# Patient Record
Sex: FEMALE | Race: ASIAN | NOT HISPANIC OR LATINO | Employment: OTHER | URBAN - METROPOLITAN AREA
[De-identification: names, ages, dates, MRNs, and addresses within clinical notes are randomized per-mention and may not be internally consistent; named-entity substitution may affect disease eponyms.]

---

## 2019-08-02 ENCOUNTER — HOME CARE/HOSPICE - HEALTHEAST (OUTPATIENT)
Dept: HOME HEALTH SERVICES | Facility: HOME HEALTH | Age: 79
End: 2019-08-02

## 2019-08-05 ENCOUNTER — HOME CARE/HOSPICE - HEALTHEAST (OUTPATIENT)
Dept: HOME HEALTH SERVICES | Facility: HOME HEALTH | Age: 79
End: 2019-08-05

## 2019-08-05 ENCOUNTER — RECORDS - HEALTHEAST (OUTPATIENT)
Dept: ADMINISTRATIVE | Facility: OTHER | Age: 79
End: 2019-08-05

## 2019-08-06 ENCOUNTER — HOME CARE/HOSPICE - HEALTHEAST (OUTPATIENT)
Dept: HOME HEALTH SERVICES | Facility: HOME HEALTH | Age: 79
End: 2019-08-06

## 2019-08-20 ENCOUNTER — ANESTHESIA - HEALTHEAST (OUTPATIENT)
Dept: SURGERY | Facility: HOSPITAL | Age: 79
End: 2019-08-20

## 2019-08-20 ASSESSMENT — MIFFLIN-ST. JEOR: SCORE: 919.02

## 2019-08-21 ENCOUNTER — SURGERY - HEALTHEAST (OUTPATIENT)
Dept: SURGERY | Facility: HOSPITAL | Age: 79
End: 2019-08-21

## 2019-08-24 ENCOUNTER — HOME CARE/HOSPICE - HEALTHEAST (OUTPATIENT)
Dept: HOME HEALTH SERVICES | Facility: HOME HEALTH | Age: 79
End: 2019-08-24

## 2019-08-26 ENCOUNTER — HOME CARE/HOSPICE - HEALTHEAST (OUTPATIENT)
Dept: HOME HEALTH SERVICES | Facility: HOME HEALTH | Age: 79
End: 2019-08-26

## 2019-08-26 ENCOUNTER — RECORDS - HEALTHEAST (OUTPATIENT)
Dept: ADMINISTRATIVE | Facility: OTHER | Age: 79
End: 2019-08-26

## 2019-08-28 ENCOUNTER — HOME CARE/HOSPICE - HEALTHEAST (OUTPATIENT)
Dept: HOME HEALTH SERVICES | Facility: HOME HEALTH | Age: 79
End: 2019-08-28

## 2019-10-16 ENCOUNTER — RECORDS - HEALTHEAST (OUTPATIENT)
Dept: ADMINISTRATIVE | Facility: OTHER | Age: 79
End: 2019-10-16

## 2019-10-24 ENCOUNTER — HOSPITAL ENCOUNTER (OUTPATIENT)
Dept: ULTRASOUND IMAGING | Facility: HOSPITAL | Age: 79
Discharge: HOME | End: 2019-10-24
Attending: ORTHOPAEDIC SURGERY

## 2019-10-24 DIAGNOSIS — S82.209A: ICD-10-CM

## 2019-10-24 DIAGNOSIS — S82.409A: ICD-10-CM

## 2019-11-05 ENCOUNTER — RECORDS - HEALTHEAST (OUTPATIENT)
Dept: ADMINISTRATIVE | Facility: OTHER | Age: 79
End: 2019-11-05

## 2021-02-09 ENCOUNTER — COMMUNICATION - HEALTHEAST (OUTPATIENT)
Dept: SCHEDULING | Facility: CLINIC | Age: 81
End: 2021-02-09

## 2021-02-14 ENCOUNTER — HOME CARE/HOSPICE - HEALTHEAST (OUTPATIENT)
Dept: HOME HEALTH SERVICES | Facility: HOME HEALTH | Age: 81
End: 2021-02-14

## 2021-03-27 ENCOUNTER — COMMUNICATION - HEALTHEAST (OUTPATIENT)
Dept: SCHEDULING | Facility: CLINIC | Age: 81
End: 2021-03-27

## 2021-05-28 ENCOUNTER — RECORDS - HEALTHEAST (OUTPATIENT)
Dept: ADMINISTRATIVE | Facility: CLINIC | Age: 81
End: 2021-05-28

## 2021-05-31 NOTE — ANESTHESIA CARE TRANSFER NOTE
Last vitals:   Vitals:    08/21/19 1955   BP: 176/84   Pulse: 90   Resp: 20   Temp: 36.6  C (97.8  F)   SpO2: 95%     Patient's level of consciousness is awake  Spontaneous respirations: yes  Maintains airway independently: yes  Dentition unchanged: yes  Oropharynx: oropharynx clear of all foreign objects    QCDR Measures:  ASA# 20 - Surgical Safety Checklist: WHO surgical safety checklist completed prior to induction    PQRS# 430 - Adult PONV Prevention: 4558F - Pt received => 2 anti-emetic agents (different classes) preop & intraop  ASA# 8 - Peds PONV Prevention: NA - Not pediatric patient, not GA or 2 or more risk factors NOT present  PQRS# 424 - Chanel-op Temp Management: 4559F - At least one body temp DOCUMENTED => 35.5C or 95.9F within required timeframe  PQRS# 426 - PACU Transfer Protocol: - Transfer of care checklist used  ASA# 14 - Acute Post-op Pain: ASA14B - Patient did NOT experience pain >= 7 out of 10

## 2021-05-31 NOTE — ANESTHESIA PROCEDURE NOTES
Spinal Block    Patient location during procedure: OR  Start time: 8/21/2019 2:46 PM  End time: 8/21/2019 2:50 PM  Reason for block: primary anesthetic    Staffing:  Performing  Anesthesiologist: Kris Camarena MD    Preanesthetic Checklist  Completed: patient identified, risks, benefits, and alternatives discussed, timeout performed, consent obtained, airway assessed, oxygen available, suction available, emergency drugs available and hand hygiene performed  Spinal Block  Patient position: sitting  Prep: ChloraPrep and site prepped and draped  Patient monitoring: heart rate, continuous pulse ox and blood pressure  Approach: left paramedian  Location: L4-5  Injection technique: single-shot  Needle type: Quincke   Needle gauge: 22 G    Assessment  Sensory level: T10

## 2021-05-31 NOTE — ANESTHESIA PREPROCEDURE EVALUATION
Anesthesia Evaluation      Patient summary reviewed     Airway   Mallampati: III   Pulmonary - normal exam   (+) shortness of breath,                          Cardiovascular   (+) hypertension, CAD, ,     ECG reviewed     ROS comment: EF 60%     Neuro/Psych - negative ROS     Endo/Other    (+) diabetes mellitus type 2 well controlled, hyperthyroidism,      GI/Hepatic/Renal    (+)   chronic renal disease,           Dental    (+) edentulous                       Anesthesia Plan  Planned anesthetic: spinal    ASA 3     Anesthetic plan and risks discussed with: patient and  services used    Post-op plan: routine recovery

## 2021-05-31 NOTE — ANESTHESIA POSTPROCEDURE EVALUATION
Patient: Mao Her  LEFT DISTAL OPEN REDUCTION INTERNAL FIXATION INTERMEDULARY NAIL OF TIBIA AND FIBULA  Anesthesia type: spinal    Patient location: PACU  Last vitals:   Vitals Value Taken Time   /78 8/21/2019  9:17 PM   Temp 35.6  C (96.1  F) 8/21/2019  9:17 PM   Pulse 66 8/21/2019  9:17 PM   Resp 18 8/21/2019  9:01 PM   SpO2 97 % 8/21/2019  9:26 PM   Vitals shown include unvalidated device data.  Post vital signs: stable  Level of consciousness: awake and responds to simple questions  Post-anesthesia pain: pain controlled  Post-anesthesia nausea and vomiting: no  Pulmonary: unassisted, return to baseline  Cardiovascular: stable and blood pressure at baseline  Hydration: adequate  Anesthetic events: no    QCDR Measures:  ASA# 11 - Chanel-op Cardiac Arrest: ASA11B - Patient did NOT experience unanticipated cardiac arrest  ASA# 12 - Chanel-op Mortality Rate: ASA12B - Patient did NOT die  ASA# 13 - PACU Re-Intubation Rate: ASA13B - Patient did NOT require a new airway mgmt  ASA# 10 - Composite Anes Safety: ASA10A - No serious adverse event    Additional Notes:

## 2021-06-01 ENCOUNTER — RECORDS - HEALTHEAST (OUTPATIENT)
Dept: ADMINISTRATIVE | Facility: CLINIC | Age: 81
End: 2021-06-01

## 2021-06-02 ENCOUNTER — RECORDS - HEALTHEAST (OUTPATIENT)
Dept: ADMINISTRATIVE | Facility: CLINIC | Age: 81
End: 2021-06-02

## 2021-06-03 ENCOUNTER — RECORDS - HEALTHEAST (OUTPATIENT)
Dept: ADMINISTRATIVE | Facility: CLINIC | Age: 81
End: 2021-06-03

## 2021-06-03 VITALS — WEIGHT: 129 LBS | HEIGHT: 57 IN | BODY MASS INDEX: 27.83 KG/M2

## 2021-06-15 PROBLEM — N17.9 AKI (ACUTE KIDNEY INJURY) (H): Status: ACTIVE | Noted: 2017-04-30

## 2021-06-15 PROBLEM — J18.9 COMMUNITY ACQUIRED PNEUMONIA, UNSPECIFIED LATERALITY: Status: ACTIVE | Noted: 2017-02-21

## 2021-06-15 PROBLEM — I10 ACCELERATED HYPERTENSION: Status: ACTIVE | Noted: 2017-02-21

## 2021-06-15 PROBLEM — J96.01 ACUTE RESPIRATORY FAILURE WITH HYPOXIA (H): Status: ACTIVE | Noted: 2017-02-21

## 2021-06-15 PROBLEM — J96.00 ACUTE RESPIRATORY FAILURE (H): Status: ACTIVE | Noted: 2017-02-21

## 2021-06-15 PROBLEM — R65.10 SIRS (SYSTEMIC INFLAMMATORY RESPONSE SYNDROME) (H): Status: ACTIVE | Noted: 2017-02-21

## 2021-06-15 PROBLEM — J10.1 INFLUENZA A: Status: ACTIVE | Noted: 2017-02-21

## 2021-06-15 PROBLEM — R07.89 CHEST TIGHTNESS: Status: ACTIVE | Noted: 2017-02-21

## 2021-06-15 PROBLEM — R52 INADEQUATE PAIN CONTROL: Status: ACTIVE | Noted: 2017-04-29

## 2021-06-15 PROBLEM — R06.03 RESPIRATORY DISTRESS: Status: ACTIVE | Noted: 2017-02-21

## 2021-06-15 PROBLEM — M54.10 RADICULAR PAIN OF RIGHT LOWER EXTREMITY: Status: ACTIVE | Noted: 2017-02-21

## 2021-06-16 PROBLEM — R06.02 SOB (SHORTNESS OF BREATH): Status: ACTIVE | Noted: 2019-06-19

## 2021-06-16 PROBLEM — I16.0 ASYMPTOMATIC HYPERTENSIVE URGENCY: Status: ACTIVE | Noted: 2019-07-31

## 2021-06-16 PROBLEM — E87.5 HYPERKALEMIA: Status: ACTIVE | Noted: 2021-02-09

## 2021-06-16 PROBLEM — I16.1 HYPERTENSIVE EMERGENCY: Status: ACTIVE | Noted: 2019-11-08

## 2021-06-16 PROBLEM — I16.0 HYPERTENSIVE URGENCY: Status: ACTIVE | Noted: 2020-02-12

## 2021-06-16 PROBLEM — E07.9 THYROID DISEASE: Status: ACTIVE | Noted: 2021-02-09

## 2021-06-16 PROBLEM — B34.9 ACUTE BRONCHOSPASM DUE TO VIRAL INFECTION: Status: ACTIVE | Noted: 2019-06-19

## 2021-06-16 PROBLEM — E56.9 VITAMIN DEFICIENCY: Status: ACTIVE | Noted: 2021-02-12

## 2021-06-16 PROBLEM — N17.9 ACUTE RENAL FAILURE SUPERIMPOSED ON STAGE 4 CHRONIC KIDNEY DISEASE, UNSPECIFIED ACUTE RENAL FAILURE TYPE (H): Status: ACTIVE | Noted: 2021-03-26

## 2021-06-16 PROBLEM — S82.402G: Status: ACTIVE | Noted: 2019-08-21

## 2021-06-16 PROBLEM — N17.9 ARF (ACUTE RENAL FAILURE) (H): Status: ACTIVE | Noted: 2021-02-10

## 2021-06-16 PROBLEM — D72.829 LEUKOCYTOSIS: Status: ACTIVE | Noted: 2019-07-31

## 2021-06-16 PROBLEM — J98.01 ACUTE BRONCHOSPASM DUE TO VIRAL INFECTION: Status: ACTIVE | Noted: 2019-06-19

## 2021-06-16 PROBLEM — K57.92 DIVERTICULITIS: Status: ACTIVE | Noted: 2018-04-17

## 2021-06-16 PROBLEM — S82.892A ANKLE FRACTURE, LEFT, CLOSED, INITIAL ENCOUNTER: Status: ACTIVE | Noted: 2019-07-31

## 2021-06-16 PROBLEM — I10 ESSENTIAL HYPERTENSION, BENIGN: Status: ACTIVE | Noted: 2020-02-12

## 2021-06-16 PROBLEM — S82.202G: Status: ACTIVE | Noted: 2019-08-21

## 2021-06-16 PROBLEM — N18.4 ACUTE RENAL FAILURE SUPERIMPOSED ON STAGE 4 CHRONIC KIDNEY DISEASE, UNSPECIFIED ACUTE RENAL FAILURE TYPE (H): Status: ACTIVE | Noted: 2021-03-26

## 2021-08-12 ENCOUNTER — HOSPITAL ENCOUNTER (INPATIENT)
Facility: HOSPITAL | Age: 81
LOS: 2 days | Discharge: HOSPICE | DRG: 682 | End: 2021-08-14
Attending: EMERGENCY MEDICINE | Admitting: HOSPITALIST
Payer: COMMERCIAL

## 2021-08-12 DIAGNOSIS — Z51.5 HOSPICE CARE PATIENT: Primary | ICD-10-CM

## 2021-08-12 DIAGNOSIS — E87.5 HYPERKALEMIA: ICD-10-CM

## 2021-08-12 DIAGNOSIS — N17.9 ACUTE RENAL FAILURE, UNSPECIFIED ACUTE RENAL FAILURE TYPE (H): ICD-10-CM

## 2021-08-12 DIAGNOSIS — I16.1 HYPERTENSIVE EMERGENCY: ICD-10-CM

## 2021-08-12 LAB
ALBUMIN SERPL-MCNC: 2.7 G/DL (ref 3.5–5)
ALBUMIN UR-MCNC: 300 MG/DL
ALP SERPL-CCNC: 112 U/L (ref 45–120)
ALT SERPL W P-5'-P-CCNC: 15 U/L (ref 0–45)
ANION GAP SERPL CALCULATED.3IONS-SCNC: 10 MMOL/L (ref 5–18)
APPEARANCE UR: CLEAR
AST SERPL W P-5'-P-CCNC: 28 U/L (ref 0–40)
BACTERIA #/AREA URNS HPF: ABNORMAL /HPF
BASOPHILS # BLD AUTO: 0 10E3/UL (ref 0–0.2)
BASOPHILS NFR BLD AUTO: 0 %
BILIRUB SERPL-MCNC: 0.4 MG/DL (ref 0–1)
BILIRUB UR QL STRIP: NEGATIVE
BNP SERPL-MCNC: 1547 PG/ML (ref 0–167)
BUN SERPL-MCNC: 73 MG/DL (ref 8–28)
CALCIUM SERPL-MCNC: 8.4 MG/DL (ref 8.5–10.5)
CHLORIDE BLD-SCNC: 111 MMOL/L (ref 98–107)
CO2 SERPL-SCNC: 16 MMOL/L (ref 22–31)
COLOR UR AUTO: COLORLESS
CREAT SERPL-MCNC: 7.91 MG/DL (ref 0.6–1.1)
EOSINOPHIL # BLD AUTO: 0.2 10E3/UL (ref 0–0.7)
EOSINOPHIL NFR BLD AUTO: 2 %
ERYTHROCYTE [DISTWIDTH] IN BLOOD BY AUTOMATED COUNT: 14.5 % (ref 10–15)
GFR SERPL CREATININE-BSD FRML MDRD: 4 ML/MIN/1.73M2
GLUCOSE BLD-MCNC: 108 MG/DL (ref 70–125)
GLUCOSE BLDC GLUCOMTR-MCNC: 103 MG/DL (ref 70–125)
GLUCOSE BLDC GLUCOMTR-MCNC: 239 MG/DL (ref 70–125)
GLUCOSE UR STRIP-MCNC: 100 MG/DL
HCT VFR BLD AUTO: 29.7 % (ref 35–47)
HGB BLD-MCNC: 9.7 G/DL (ref 11.7–15.7)
HGB UR QL STRIP: ABNORMAL
HYALINE CASTS: 2 /LPF
IMM GRANULOCYTES # BLD: 0 10E3/UL
IMM GRANULOCYTES NFR BLD: 0 %
KETONES UR STRIP-MCNC: NEGATIVE MG/DL
LEUKOCYTE ESTERASE UR QL STRIP: NEGATIVE
LYMPHOCYTES # BLD AUTO: 3.1 10E3/UL (ref 0.8–5.3)
LYMPHOCYTES NFR BLD AUTO: 28 %
MAGNESIUM SERPL-MCNC: 2.5 MG/DL (ref 1.8–2.6)
MCH RBC QN AUTO: 28 PG (ref 26.5–33)
MCHC RBC AUTO-ENTMCNC: 32.7 G/DL (ref 31.5–36.5)
MCV RBC AUTO: 86 FL (ref 78–100)
MONOCYTES # BLD AUTO: 0.6 10E3/UL (ref 0–1.3)
MONOCYTES NFR BLD AUTO: 5 %
MUCOUS THREADS #/AREA URNS LPF: PRESENT /LPF
NEUTROPHILS # BLD AUTO: 7.1 10E3/UL (ref 1.6–8.3)
NEUTROPHILS NFR BLD AUTO: 65 %
NITRATE UR QL: NEGATIVE
NRBC # BLD AUTO: 0 10E3/UL
NRBC BLD AUTO-RTO: 0 /100
PH UR STRIP: 7 [PH] (ref 5–7)
PLATELET # BLD AUTO: 108 10E3/UL (ref 150–450)
POTASSIUM BLD-SCNC: 6.2 MMOL/L (ref 3.5–5)
PROT SERPL-MCNC: 6.6 G/DL (ref 6–8)
RBC # BLD AUTO: 3.46 10E6/UL (ref 3.8–5.2)
RBC URINE: 4 /HPF
SARS-COV-2 RNA RESP QL NAA+PROBE: NEGATIVE
SODIUM SERPL-SCNC: 137 MMOL/L (ref 136–145)
SP GR UR STRIP: 1.01 (ref 1–1.03)
SQUAMOUS EPITHELIAL: 1 /HPF
TROPONIN I SERPL-MCNC: 0.02 NG/ML (ref 0–0.29)
UROBILINOGEN UR STRIP-MCNC: <2 MG/DL
WBC # BLD AUTO: 11 10E3/UL (ref 4–11)
WBC URINE: 5 /HPF

## 2021-08-12 PROCEDURE — 83880 ASSAY OF NATRIURETIC PEPTIDE: CPT | Performed by: EMERGENCY MEDICINE

## 2021-08-12 PROCEDURE — 96375 TX/PRO/DX INJ NEW DRUG ADDON: CPT

## 2021-08-12 PROCEDURE — 120N000001 HC R&B MED SURG/OB

## 2021-08-12 PROCEDURE — 258N000001 HC RX 258: Performed by: EMERGENCY MEDICINE

## 2021-08-12 PROCEDURE — 84484 ASSAY OF TROPONIN QUANT: CPT | Performed by: EMERGENCY MEDICINE

## 2021-08-12 PROCEDURE — 250N000011 HC RX IP 250 OP 636: Performed by: EMERGENCY MEDICINE

## 2021-08-12 PROCEDURE — 250N000009 HC RX 250: Performed by: EMERGENCY MEDICINE

## 2021-08-12 PROCEDURE — 99285 EMERGENCY DEPT VISIT HI MDM: CPT | Mod: 25

## 2021-08-12 PROCEDURE — 96374 THER/PROPH/DIAG INJ IV PUSH: CPT

## 2021-08-12 PROCEDURE — 80053 COMPREHEN METABOLIC PANEL: CPT | Performed by: EMERGENCY MEDICINE

## 2021-08-12 PROCEDURE — 36415 COLL VENOUS BLD VENIPUNCTURE: CPT | Performed by: EMERGENCY MEDICINE

## 2021-08-12 PROCEDURE — 81001 URINALYSIS AUTO W/SCOPE: CPT | Performed by: EMERGENCY MEDICINE

## 2021-08-12 PROCEDURE — 93005 ELECTROCARDIOGRAM TRACING: CPT | Performed by: EMERGENCY MEDICINE

## 2021-08-12 PROCEDURE — 250N000012 HC RX MED GY IP 250 OP 636 PS 637: Performed by: EMERGENCY MEDICINE

## 2021-08-12 PROCEDURE — 87635 SARS-COV-2 COVID-19 AMP PRB: CPT | Performed by: EMERGENCY MEDICINE

## 2021-08-12 PROCEDURE — 83735 ASSAY OF MAGNESIUM: CPT | Performed by: EMERGENCY MEDICINE

## 2021-08-12 PROCEDURE — 85025 COMPLETE CBC W/AUTO DIFF WBC: CPT | Performed by: EMERGENCY MEDICINE

## 2021-08-12 PROCEDURE — C9803 HOPD COVID-19 SPEC COLLECT: HCPCS

## 2021-08-12 RX ORDER — HYDRALAZINE HYDROCHLORIDE 20 MG/ML
10 INJECTION INTRAMUSCULAR; INTRAVENOUS ONCE
Status: COMPLETED | OUTPATIENT
Start: 2021-08-12 | End: 2021-08-12

## 2021-08-12 RX ORDER — NITROGLYCERIN 20 MG/100ML
10-200 INJECTION INTRAVENOUS CONTINUOUS
Status: DISCONTINUED | OUTPATIENT
Start: 2021-08-12 | End: 2021-08-13

## 2021-08-12 RX ORDER — CALCIUM GLUCONATE 20 MG/ML
1 INJECTION, SOLUTION INTRAVENOUS ONCE
Status: COMPLETED | OUTPATIENT
Start: 2021-08-12 | End: 2021-08-13

## 2021-08-12 RX ORDER — HYDRALAZINE HYDROCHLORIDE 20 MG/ML
10 INJECTION INTRAMUSCULAR; INTRAVENOUS ONCE
Status: DISCONTINUED | OUTPATIENT
Start: 2021-08-12 | End: 2021-08-13

## 2021-08-12 RX ORDER — NICOTINE POLACRILEX 4 MG
15-30 LOZENGE BUCCAL
Status: DISCONTINUED | OUTPATIENT
Start: 2021-08-12 | End: 2021-08-14 | Stop reason: HOSPADM

## 2021-08-12 RX ORDER — DEXTROSE MONOHYDRATE 100 MG/ML
INJECTION, SOLUTION INTRAVENOUS CONTINUOUS
Status: DISCONTINUED | OUTPATIENT
Start: 2021-08-12 | End: 2021-08-13

## 2021-08-12 RX ORDER — DEXTROSE MONOHYDRATE 25 G/50ML
25-50 INJECTION, SOLUTION INTRAVENOUS
Status: DISCONTINUED | OUTPATIENT
Start: 2021-08-12 | End: 2021-08-14 | Stop reason: HOSPADM

## 2021-08-12 RX ORDER — ALBUTEROL SULFATE 5 MG/ML
10 SOLUTION RESPIRATORY (INHALATION) ONCE
Status: COMPLETED | OUTPATIENT
Start: 2021-08-12 | End: 2021-08-12

## 2021-08-12 RX ORDER — LIDOCAINE 40 MG/G
CREAM TOPICAL
Status: DISCONTINUED | OUTPATIENT
Start: 2021-08-12 | End: 2021-08-14 | Stop reason: HOSPADM

## 2021-08-12 RX ORDER — LORAZEPAM 2 MG/ML
1 INJECTION INTRAMUSCULAR ONCE
Status: COMPLETED | OUTPATIENT
Start: 2021-08-12 | End: 2021-08-12

## 2021-08-12 RX ORDER — DEXTROSE MONOHYDRATE 25 G/50ML
25 INJECTION, SOLUTION INTRAVENOUS ONCE
Status: COMPLETED | OUTPATIENT
Start: 2021-08-12 | End: 2021-08-12

## 2021-08-12 RX ADMIN — CALCIUM GLUCONATE 1 G: 20 INJECTION, SOLUTION INTRAVENOUS at 23:50

## 2021-08-12 RX ADMIN — DEXTROSE MONOHYDRATE 25 G: 500 INJECTION PARENTERAL at 23:30

## 2021-08-12 RX ADMIN — LORAZEPAM 1 MG: 2 INJECTION INTRAMUSCULAR; INTRAVENOUS at 23:00

## 2021-08-12 RX ADMIN — HUMAN INSULIN 6 UNITS: 100 INJECTION, SOLUTION SUBCUTANEOUS at 23:31

## 2021-08-12 RX ADMIN — HYDRALAZINE HYDROCHLORIDE 10 MG: 20 INJECTION INTRAMUSCULAR; INTRAVENOUS at 22:34

## 2021-08-12 RX ADMIN — SODIUM BICARBONATE 50 MEQ: 84 INJECTION, SOLUTION INTRAVENOUS at 23:30

## 2021-08-12 RX ADMIN — ALBUTEROL SULFATE 10 MG: 2.5 SOLUTION RESPIRATORY (INHALATION) at 23:47

## 2021-08-12 ASSESSMENT — ENCOUNTER SYMPTOMS
VOMITING: 0
ARTHRALGIAS: 0
ABDOMINAL PAIN: 0
HEADACHES: 0
DIFFICULTY URINATING: 0
EYE REDNESS: 0
SHORTNESS OF BREATH: 1
FATIGUE: 1
COUGH: 0
CONFUSION: 0
FEVER: 0
NECK STIFFNESS: 0
COLOR CHANGE: 0

## 2021-08-12 ASSESSMENT — MIFFLIN-ST. JEOR: SCORE: 877.74

## 2021-08-13 LAB
ANION GAP SERPL CALCULATED.3IONS-SCNC: 9 MMOL/L (ref 5–18)
BASE EXCESS BLDA CALC-SCNC: -5 MMOL/L
BUN SERPL-MCNC: 66 MG/DL (ref 8–28)
CALCIUM SERPL-MCNC: 8.3 MG/DL (ref 8.5–10.5)
CHLORIDE BLD-SCNC: 111 MMOL/L (ref 98–107)
CO2 SERPL-SCNC: 20 MMOL/L (ref 22–31)
COHGB MFR BLD: 95.1 % (ref 95–96)
CREAT SERPL-MCNC: 7.95 MG/DL (ref 0.6–1.1)
GFR SERPL CREATININE-BSD FRML MDRD: 4 ML/MIN/1.73M2
GLUCOSE BLD-MCNC: 141 MG/DL (ref 70–125)
GLUCOSE BLDC GLUCOMTR-MCNC: 123 MG/DL (ref 70–125)
GLUCOSE BLDC GLUCOMTR-MCNC: 135 MG/DL (ref 70–125)
GLUCOSE BLDC GLUCOMTR-MCNC: 138 MG/DL (ref 70–125)
GLUCOSE BLDC GLUCOMTR-MCNC: 143 MG/DL (ref 70–125)
GLUCOSE BLDC GLUCOMTR-MCNC: 149 MG/DL (ref 70–125)
GLUCOSE BLDC GLUCOMTR-MCNC: 165 MG/DL (ref 70–125)
GLUCOSE BLDC GLUCOMTR-MCNC: 196 MG/DL (ref 70–125)
HCO3 BLD-SCNC: 21 MMOL/L (ref 23–29)
INR PPP: 1.06 (ref 0.85–1.15)
O2/TOTAL GAS SETTING VFR VENT: 29 %
OXYHGB MFR BLD: 93.8 % (ref 95–96)
PCO2 BLD: 41 MM HG (ref 35–45)
PH BLD: 7.32 [PH] (ref 7.37–7.44)
PO2 BLD: 72 MM HG (ref 75–85)
POTASSIUM BLD-SCNC: 5.2 MMOL/L (ref 3.5–5)
POTASSIUM BLD-SCNC: 5.4 MMOL/L (ref 3.5–5)
POTASSIUM BLD-SCNC: 5.4 MMOL/L (ref 3.5–5)
SODIUM SERPL-SCNC: 140 MMOL/L (ref 136–145)
TEMPERATURE: 37 DEGREES C

## 2021-08-13 PROCEDURE — 82805 BLOOD GASES W/O2 SATURATION: CPT | Performed by: NURSE PRACTITIONER

## 2021-08-13 PROCEDURE — 120N000001 HC R&B MED SURG/OB

## 2021-08-13 PROCEDURE — 99207 PR CONSULT E&M CHANGED TO INITIAL LEVEL: CPT | Performed by: FAMILY MEDICINE

## 2021-08-13 PROCEDURE — 250N000011 HC RX IP 250 OP 636: Performed by: HOSPITALIST

## 2021-08-13 PROCEDURE — 99221 1ST HOSP IP/OBS SF/LOW 40: CPT | Performed by: FAMILY MEDICINE

## 2021-08-13 PROCEDURE — 250N000012 HC RX MED GY IP 250 OP 636 PS 637: Performed by: HOSPITALIST

## 2021-08-13 PROCEDURE — 99223 1ST HOSP IP/OBS HIGH 75: CPT | Performed by: HOSPITALIST

## 2021-08-13 PROCEDURE — 99233 SBSQ HOSP IP/OBS HIGH 50: CPT | Performed by: INTERNAL MEDICINE

## 2021-08-13 PROCEDURE — 82310 ASSAY OF CALCIUM: CPT | Performed by: HOSPITALIST

## 2021-08-13 PROCEDURE — 85610 PROTHROMBIN TIME: CPT | Performed by: INTERNAL MEDICINE

## 2021-08-13 PROCEDURE — 250N000009 HC RX 250: Performed by: INTERNAL MEDICINE

## 2021-08-13 PROCEDURE — 250N000011 HC RX IP 250 OP 636: Performed by: INTERNAL MEDICINE

## 2021-08-13 PROCEDURE — 36415 COLL VENOUS BLD VENIPUNCTURE: CPT | Performed by: EMERGENCY MEDICINE

## 2021-08-13 PROCEDURE — 250N000009 HC RX 250: Performed by: EMERGENCY MEDICINE

## 2021-08-13 PROCEDURE — 36569 INSJ PICC 5 YR+ W/O IMAGING: CPT

## 2021-08-13 PROCEDURE — 250N000013 HC RX MED GY IP 250 OP 250 PS 637: Performed by: FAMILY MEDICINE

## 2021-08-13 PROCEDURE — 80048 BASIC METABOLIC PNL TOTAL CA: CPT | Performed by: EMERGENCY MEDICINE

## 2021-08-13 PROCEDURE — 250N000011 HC RX IP 250 OP 636: Performed by: EMERGENCY MEDICINE

## 2021-08-13 PROCEDURE — 96375 TX/PRO/DX INJ NEW DRUG ADDON: CPT

## 2021-08-13 RX ORDER — GLYCOPYRROLATE 0.2 MG/ML
0.2 INJECTION, SOLUTION INTRAMUSCULAR; INTRAVENOUS EVERY 4 HOURS PRN
Status: DISCONTINUED | OUTPATIENT
Start: 2021-08-13 | End: 2021-08-14 | Stop reason: HOSPADM

## 2021-08-13 RX ORDER — NALOXONE HYDROCHLORIDE 0.4 MG/ML
0.1 INJECTION, SOLUTION INTRAMUSCULAR; INTRAVENOUS; SUBCUTANEOUS
Status: DISCONTINUED | OUTPATIENT
Start: 2021-08-13 | End: 2021-08-14 | Stop reason: HOSPADM

## 2021-08-13 RX ORDER — HEPARIN SODIUM 1000 [USP'U]/ML
500 INJECTION, SOLUTION INTRAVENOUS; SUBCUTANEOUS
Status: DISCONTINUED | OUTPATIENT
Start: 2021-08-13 | End: 2021-08-13

## 2021-08-13 RX ORDER — LORAZEPAM 1 MG/1
1 TABLET ORAL
Status: DISCONTINUED | OUTPATIENT
Start: 2021-08-13 | End: 2021-08-14 | Stop reason: HOSPADM

## 2021-08-13 RX ORDER — NALOXONE HYDROCHLORIDE 0.4 MG/ML
0.2 INJECTION, SOLUTION INTRAMUSCULAR; INTRAVENOUS; SUBCUTANEOUS
Status: DISCONTINUED | OUTPATIENT
Start: 2021-08-13 | End: 2021-08-14 | Stop reason: HOSPADM

## 2021-08-13 RX ORDER — PROCHLORPERAZINE MALEATE 5 MG
5 TABLET ORAL EVERY 6 HOURS PRN
Status: DISCONTINUED | OUTPATIENT
Start: 2021-08-13 | End: 2021-08-14 | Stop reason: HOSPADM

## 2021-08-13 RX ORDER — LABETALOL HYDROCHLORIDE 5 MG/ML
20 INJECTION, SOLUTION INTRAVENOUS EVERY 4 HOURS PRN
Status: DISCONTINUED | OUTPATIENT
Start: 2021-08-13 | End: 2021-08-14 | Stop reason: HOSPADM

## 2021-08-13 RX ORDER — HEPARIN SODIUM 5000 [USP'U]/.5ML
5000 INJECTION, SOLUTION INTRAVENOUS; SUBCUTANEOUS EVERY 12 HOURS
Status: DISCONTINUED | OUTPATIENT
Start: 2021-08-13 | End: 2021-08-13

## 2021-08-13 RX ORDER — HYDROMORPHONE HYDROCHLORIDE 1 MG/ML
.3-.5 INJECTION, SOLUTION INTRAMUSCULAR; INTRAVENOUS; SUBCUTANEOUS
Status: DISCONTINUED | OUTPATIENT
Start: 2021-08-13 | End: 2021-08-14 | Stop reason: HOSPADM

## 2021-08-13 RX ORDER — PROCHLORPERAZINE 25 MG
12.5 SUPPOSITORY, RECTAL RECTAL EVERY 12 HOURS PRN
Status: DISCONTINUED | OUTPATIENT
Start: 2021-08-13 | End: 2021-08-14 | Stop reason: HOSPADM

## 2021-08-13 RX ORDER — PANTOPRAZOLE SODIUM 20 MG/1
40 TABLET, DELAYED RELEASE ORAL
Status: DISCONTINUED | OUTPATIENT
Start: 2021-08-14 | End: 2021-08-14 | Stop reason: HOSPADM

## 2021-08-13 RX ORDER — SALIVA STIMULANT COMB. NO.3
2 SPRAY, NON-AEROSOL (ML) MUCOUS MEMBRANE 4 TIMES DAILY PRN
Status: DISCONTINUED | OUTPATIENT
Start: 2021-08-13 | End: 2021-08-14 | Stop reason: HOSPADM

## 2021-08-13 RX ORDER — ONDANSETRON 4 MG/1
4 TABLET, ORALLY DISINTEGRATING ORAL EVERY 6 HOURS PRN
Status: DISCONTINUED | OUTPATIENT
Start: 2021-08-13 | End: 2021-08-14 | Stop reason: HOSPADM

## 2021-08-13 RX ORDER — LORAZEPAM 2 MG/ML
1 INJECTION INTRAMUSCULAR
Status: DISCONTINUED | OUTPATIENT
Start: 2021-08-13 | End: 2021-08-14 | Stop reason: HOSPADM

## 2021-08-13 RX ORDER — HYDROMORPHONE HYDROCHLORIDE 1 MG/ML
1-2 SOLUTION ORAL EVERY 4 HOURS PRN
Status: DISCONTINUED | OUTPATIENT
Start: 2021-08-13 | End: 2021-08-14 | Stop reason: HOSPADM

## 2021-08-13 RX ORDER — ONDANSETRON 2 MG/ML
4 INJECTION INTRAMUSCULAR; INTRAVENOUS EVERY 6 HOURS PRN
Status: DISCONTINUED | OUTPATIENT
Start: 2021-08-13 | End: 2021-08-14 | Stop reason: HOSPADM

## 2021-08-13 RX ORDER — CARBOXYMETHYLCELLULOSE SODIUM 5 MG/ML
1-2 SOLUTION/ DROPS OPHTHALMIC
Status: DISCONTINUED | OUTPATIENT
Start: 2021-08-13 | End: 2021-08-14 | Stop reason: HOSPADM

## 2021-08-13 RX ORDER — DEXTROSE MONOHYDRATE 25 G/50ML
25-50 INJECTION, SOLUTION INTRAVENOUS
Status: DISCONTINUED | OUTPATIENT
Start: 2021-08-13 | End: 2021-08-14 | Stop reason: HOSPADM

## 2021-08-13 RX ORDER — IPRATROPIUM BROMIDE AND ALBUTEROL SULFATE 2.5; .5 MG/3ML; MG/3ML
3 SOLUTION RESPIRATORY (INHALATION) EVERY 4 HOURS PRN
Status: DISCONTINUED | OUTPATIENT
Start: 2021-08-13 | End: 2021-08-14 | Stop reason: HOSPADM

## 2021-08-13 RX ORDER — BUMETANIDE 0.25 MG/ML
5 INJECTION INTRAMUSCULAR; INTRAVENOUS ONCE
Status: COMPLETED | OUTPATIENT
Start: 2021-08-13 | End: 2021-08-13

## 2021-08-13 RX ORDER — AMOXICILLIN 250 MG
1 CAPSULE ORAL 2 TIMES DAILY
Status: DISCONTINUED | OUTPATIENT
Start: 2021-08-13 | End: 2021-08-14 | Stop reason: HOSPADM

## 2021-08-13 RX ORDER — MINERAL OIL/HYDROPHIL PETROLAT
OINTMENT (GRAM) TOPICAL
Status: DISCONTINUED | OUTPATIENT
Start: 2021-08-13 | End: 2021-08-14 | Stop reason: HOSPADM

## 2021-08-13 RX ORDER — ACETAMINOPHEN 325 MG/1
650 TABLET ORAL EVERY 6 HOURS PRN
Status: DISCONTINUED | OUTPATIENT
Start: 2021-08-13 | End: 2021-08-14 | Stop reason: HOSPADM

## 2021-08-13 RX ORDER — NICOTINE POLACRILEX 4 MG
15-30 LOZENGE BUCCAL
Status: DISCONTINUED | OUTPATIENT
Start: 2021-08-13 | End: 2021-08-14 | Stop reason: HOSPADM

## 2021-08-13 RX ORDER — METOPROLOL SUCCINATE 25 MG/1
25 TABLET, EXTENDED RELEASE ORAL DAILY
COMMUNITY

## 2021-08-13 RX ORDER — ACETAMINOPHEN 650 MG/1
650 SUPPOSITORY RECTAL EVERY 6 HOURS PRN
Status: DISCONTINUED | OUTPATIENT
Start: 2021-08-13 | End: 2021-08-14 | Stop reason: HOSPADM

## 2021-08-13 RX ORDER — LIDOCAINE 40 MG/G
CREAM TOPICAL
Status: DISCONTINUED | OUTPATIENT
Start: 2021-08-13 | End: 2021-08-13

## 2021-08-13 RX ORDER — HYDRALAZINE HYDROCHLORIDE 20 MG/ML
10 INJECTION INTRAMUSCULAR; INTRAVENOUS EVERY 4 HOURS PRN
Status: DISCONTINUED | OUTPATIENT
Start: 2021-08-13 | End: 2021-08-13

## 2021-08-13 RX ORDER — HALOPERIDOL 2 MG/ML
1 SOLUTION ORAL
Status: DISCONTINUED | OUTPATIENT
Start: 2021-08-13 | End: 2021-08-14 | Stop reason: HOSPADM

## 2021-08-13 RX ORDER — MORPHINE SULFATE 4 MG/ML
4 INJECTION, SOLUTION INTRAMUSCULAR; INTRAVENOUS ONCE
Status: COMPLETED | OUTPATIENT
Start: 2021-08-13 | End: 2021-08-13

## 2021-08-13 RX ORDER — ACETAMINOPHEN 650 MG/1
650 SUPPOSITORY RECTAL EVERY 6 HOURS PRN
Status: DISCONTINUED | OUTPATIENT
Start: 2021-08-13 | End: 2021-08-13

## 2021-08-13 RX ORDER — HEPARIN SODIUM 1000 [USP'U]/ML
500 INJECTION, SOLUTION INTRAVENOUS; SUBCUTANEOUS CONTINUOUS
Status: DISCONTINUED | OUTPATIENT
Start: 2021-08-13 | End: 2021-08-13

## 2021-08-13 RX ORDER — ACETAMINOPHEN 325 MG/1
975 TABLET ORAL 3 TIMES DAILY
Status: DISCONTINUED | OUTPATIENT
Start: 2021-08-13 | End: 2021-08-14 | Stop reason: HOSPADM

## 2021-08-13 RX ORDER — BISACODYL 10 MG
10 SUPPOSITORY, RECTAL RECTAL
Status: DISCONTINUED | OUTPATIENT
Start: 2021-08-16 | End: 2021-08-14 | Stop reason: HOSPADM

## 2021-08-13 RX ADMIN — CAMPHOR AND MENTHOL 1 APPLICATOR: 5; 5 LOTION TOPICAL at 18:04

## 2021-08-13 RX ADMIN — INSULIN ASPART 1 UNITS: 100 INJECTION, SOLUTION INTRAVENOUS; SUBCUTANEOUS at 04:03

## 2021-08-13 RX ADMIN — SODIUM BICARBONATE 50 MEQ: 84 INJECTION, SOLUTION INTRAVENOUS at 02:41

## 2021-08-13 RX ADMIN — LIDOCAINE HYDROCHLORIDE 1.5 ML: 10 INJECTION, SOLUTION EPIDURAL; INFILTRATION; INTRACAUDAL; PERINEURAL at 00:15

## 2021-08-13 RX ADMIN — MORPHINE SULFATE 4 MG: 4 INJECTION, SOLUTION INTRAMUSCULAR; INTRAVENOUS at 00:14

## 2021-08-13 RX ADMIN — LABETALOL HYDROCHLORIDE 20 MG: 5 INJECTION, SOLUTION INTRAVENOUS at 22:49

## 2021-08-13 RX ADMIN — HEPARIN SODIUM 5000 UNITS: 10000 INJECTION, SOLUTION INTRAVENOUS; SUBCUTANEOUS at 09:39

## 2021-08-13 RX ADMIN — BUMETANIDE 5 MG: 0.25 INJECTION INTRAMUSCULAR; INTRAVENOUS at 02:22

## 2021-08-13 ASSESSMENT — ACTIVITIES OF DAILY LIVING (ADL)
DOING_ERRANDS_INDEPENDENTLY_DIFFICULTY: NO
WEAR_GLASSES_OR_BLIND: YES
DIFFICULTY_COMMUNICATING: YES
DIFFICULTY_EATING/SWALLOWING: NO
TOILETING_ASSISTANCE: TOILETING DIFFICULTY, ASSISTANCE 1 PERSON
HEARING_DIFFICULTY_OR_DEAF: NO
FALL_HISTORY_WITHIN_LAST_SIX_MONTHS: YES
WALKING_OR_CLIMBING_STAIRS_DIFFICULTY: YES
DRESSING/BATHING: BATHING DIFFICULTY, ASSISTANCE 1 PERSON;DRESSING DIFFICULTY, ASSISTANCE 1 PERSON
INTERPRETER_SERVICES_OFFERED_TO_THE_PATIENT: YES
DRESSING/BATHING_DIFFICULTY: YES
WALKING_OR_CLIMBING_STAIRS: AMBULATION DIFFICULTY, ASSISTANCE 1 PERSON;AMBULATION DIFFICULTY, REQUIRES EQUIPMENT
TOILETING_ISSUES: YES
CONCENTRATING,_REMEMBERING_OR_MAKING_DECISIONS_DIFFICULTY: YES
EQUIPMENT_CURRENTLY_USED_AT_HOME: CANE, STRAIGHT
DEPENDENT_IADLS:: MEAL PREPARATION;MEDICATION MANAGEMENT;TRANSPORTATION
NUMBER_OF_TIMES_PATIENT_HAS_FALLEN_WITHIN_LAST_SIX_MONTHS: 1
PATIENT_/_FAMILY_COMMUNICATION_STYLE: SPOKEN LANGUAGE (NON-ENGLISH)

## 2021-08-13 ASSESSMENT — MIFFLIN-ST. JEOR: SCORE: 815.38

## 2021-08-13 NOTE — PLAN OF CARE
Problem: Pain Acute  Goal: Acceptable Pain Control and Functional Ability  Outcome: Improving  Intervention: Prevent or Manage Pain  Recent Flowsheet Documentation  Taken 8/13/2021 0400 by Deanne Churchill RN  Sensory Stimulation Regulation:   auditory stimulation minimized   care clustered   quiet environment promoted  Sleep/Rest Enhancement:   awakenings minimized   noise level reduced   regular sleep/rest pattern promoted   room darkened  Medication Review/Management: medications reviewed  Taken 8/13/2021 0300 by Deanne Churchill RN  Sensory Stimulation Regulation:   auditory stimulation minimized   care clustered   quiet environment promoted  Sleep/Rest Enhancement:   awakenings minimized   noise level reduced   regular sleep/rest pattern promoted   room darkened  Medication Review/Management: medications reviewed

## 2021-08-13 NOTE — PHARMACY-ADMISSION MEDICATION HISTORY
Pharmacy Note - Admission Medication History    Pertinent Provider Information: Information provided by the patient's son. He did not bring in bottles for atorvastatin and torsemide, however, he stated that she has them at home but only takes these occasionally.     ______________________________________________________________________    Prior To Admission (PTA) med list completed and updated in EMR.       PTA Med List   Medication Sig Note Last Dose     acetaminophen (TYLENOL) 325 MG tablet Take 975 mg by mouth every 6 hours as needed   PRN     aspirin 81 MG EC tablet [ASPIRIN 81 MG EC TABLET] Take 1 tablet (81 mg total) by mouth daily.  8/12/2021 at Unknown time     atorvastatin (LIPITOR) 40 MG tablet [ATORVASTATIN (LIPITOR) 40 MG TABLET] Take 40 mg by mouth daily. 8/13/2021: Family did not bring in a Rx bottle for this. Her son stated that she has it at home and sometimes takes it but not regularly      fluticasone propionate (FLONASE) 50 mcg/actuation nasal spray [FLUTICASONE PROPIONATE (FLONASE) 50 MCG/ACTUATION NASAL SPRAY] Apply 1 spray into each nostril daily as needed for rhinitis.       hydrALAZINE (APRESOLINE) 25 MG tablet [HYDRALAZINE (APRESOLINE) 25 MG TABLET] Take 1 tablet (25 mg total) by mouth 3 (three) times a day.  8/12/2021 at Unknown time     latanoprost (XALATAN) 0.005 % ophthalmic solution [LATANOPROST (XALATAN) 0.005 % OPHTHALMIC SOLUTION] Administer 1 drop to both eyes at bedtime.  8/12/2021 at Unknown time     levothyroxine (SYNTHROID, LEVOTHROID) 125 MCG tablet [LEVOTHYROXINE (SYNTHROID, LEVOTHROID) 125 MCG TABLET] Take 1 tablet (125 mcg total) by mouth Daily at 6:00 am.  8/12/2021 at Unknown time     metoprolol succinate ER (TOPROL-XL) 25 MG 24 hr tablet Take 25 mg by mouth daily  8/12/2021 at Unknown time     NIFEdipine ER (ADALAT CC) 60 MG 24 hr tablet Take 60 mg by mouth daily  8/12/2021 at Unknown time     NOVOLOG FLEXPEN U-100 INSULIN 100 unit/mL (3 mL) injection pen [NOVOLOG FLEXPEN  U-100 INSULIN 100 UNIT/ML (3 ML) INJECTION PEN] Check blood sugar four (4) times daily.  11.65 Type 2 with hyperglycemia  BD Ultra-fine Ashley Pen Needles - NDC [address]-24 - dispense 1 case,  refill PRN for 1 year  Microlet Color Lancets (100s) - dispense 1, refill PRN for 1 year  Contour Next Test Strips (50's) - dispense 1, refill PRN for 1 year       torsemide (DEMADEX) 100 MG tablet [TORSEMIDE (DEMADEX) 100 MG TABLET] Take 1 tablet (100 mg total) by mouth daily. 8/13/2021: Family did not bring in a Rx bottle for this. Her son stated that she has it at home and sometimes takes it but not regularly        Information source(s): Family member, Prescription bottles and CareEverywhere/SureScripts  Method of interview communication: in-person    Summary of Changes to PTA Med List  New: metoprolol  Discontinued: omeprazole, Liquifilm tears, Lantus, calcium/vit d  Changed: nifedipine    Patient was asked about OTC/herbal products specifically.  PTA med list reflects this.      Allergies were reviewed, assessed, and updated with the patient.      Patient did not bring any medications to the hospital and can't retrieve from home. No multi-dose medications are available for use during hospital stay.     The information provided in this note is only as accurate as the sources available at the time of the update(s).    Thank you for the opportunity to participate in the care of this patient.    Nakia Eisenberg RPH  8/13/2021 1:29 PM

## 2021-08-13 NOTE — PROGRESS NOTES
Beau Her is a 81 year old female with history of advanced chronic kidney disease stage V, type 2 diabetes, hypertension, hypothyroidism, chronic anemia, dyslipidemia, GERD, chronic pain, COPD brought to the emergency department for evaluation of progressive fatigue, loss of appetite, shortness of breath and worsening edema for about a wk. Admitted by my colleague this morning. I saw the patient in ICU this morning. Case discussed with patient's son, Eder over the phone, and Dr Burger from [facility]. Decision has been made not to initiate HD. Family members chose hospice approach. Patient is DNR/DNI now. Will transfer to the floor care. Hospice consulted. Possible discharge home with hospice tomorrow.

## 2021-08-13 NOTE — PROCEDURES
"PICC Line Insertion Procedure Note    Pt. Name: Beau Lam  MRN:        [id number]    Procedure: Insertion of a  TRIPLE Lumen  5 fr  Bard SOLO (valved) Power PICC, Lot number [id number]    Indications: Multiple Infusions    Contraindications : None    Procedure Details   Patient identified with 2 identifiers and \"Time Out\" conducted.  .     Central line insertion bundle followed: hand hygiene performed prior to procedure, site cleansed with cholraprep, hat, mask, sterile gloves,sterile gown worn, patient draped with maximum barrier head to toe drape, sterile field maintained.    The vein was assessed and found to be compressible and of adequate size. 1.5 ml 1% Lidocaine administered sq to the insertion site. A 5 Fr PICC was inserted into the BASILIC vein of the right arm with ultrasound guidance. ONE attempt(s) required to access vein.   Catheter threaded without difficulty. Good blood return noted.    Modified Seldinger Technique used for insertion.    The 8 sharps that are included in the PICC insertion kit were accounted for and disposed of in the sharps container prior to breakdown of the sterile field.    Catheter secured with Statlock, biopatch and Tegaderm dressing applied.    Findings:  Total catheter length  31 cm, with 0 cm exposed. Mid upper arm circumference is 27 cm. Catheter was flushed with 30 cc NS. Patient  tolerated procedure well.    Tip placement verified in the distal SVC by 3CG technology:        CLABSI prevention brochure left at bedside.    Patient's primary RN notified PICC is ready for use.    Comments:          Evan Lang, RN, MSN, RCIS, VA-BC   Vascular Access - [facility]        "

## 2021-08-13 NOTE — PLAN OF CARE
Problem: Adult Inpatient Plan of Care  Goal: Patient-Specific Goal (Individualized)  Outcome: Change based on patient need/priority  Flowsheets (Taken 8/13/2021 3654)  Individualized Care Needs: pt has moved to comfort hospice care  Patient-Specific Goals (Include Timeframe): transfer home on hospice care

## 2021-08-13 NOTE — CONSULTS
Care Management Initial Consult    General Information  Assessment completed with: VM-chart review, details from previous admission       Primary Care Provider verified and updated as needed:     Readmission within the last 30 days:        Reason for Consult: discharge planning  Advance Care Planning:            Communication Assessment  Patient's communication style: spoken language (non-English)    Hearing Difficulty or Deaf: no   Wear Glasses or Blind: yes    Cognitive  Cognitive/Neuro/Behavioral: WDL                      Living Environment:   People in home: child(monie), adult     Current living Arrangements: house      Able to return to prior arrangements: yes       Family/Social Support:  Care provided by: child(monie)  Provides care for:       Children          Description of Support System:           Current Resources:   Patient receiving home care services:       Community Resources: PCA (son)  Equipment currently used at home: cane, straight  Supplies currently used at home:      Employment/Financial:  Employment Status:          Financial Concerns:             Lifestyle & Psychosocial Needs:  Social Determinants of Health     Tobacco Use: Low Risk      Smoking Tobacco Use: Never Smoker     Smokeless Tobacco Use: Never Used   Alcohol Use:      Frequency of Alcohol Consumption:      Average Number of Drinks:      Frequency of Binge Drinking:    Financial Resource Strain:      Difficulty of Paying Living Expenses:    Food Insecurity:      Worried About Running Out of Food in the Last Year:      Ran Out of Food in the Last Year:    Transportation Needs:      Lack of Transportation (Medical):      Lack of Transportation (Non-Medical):    Physical Activity:      Days of Exercise per Week:      Minutes of Exercise per Session:    Stress:      Feeling of Stress :    Social Connections:      Frequency of Communication with Friends and Family:      Frequency of Social Gatherings with Friends and Family:      Attends  Jain Services:      Active Member of Clubs or Organizations:      Attends Club or Organization Meetings:      Marital Status:    Intimate Partner Violence:      Fear of Current or Ex-Partner:      Emotionally Abused:      Physically Abused:      Sexually Abused:    Depression:      PHQ-2 Score:    Housing Stability:      Unable to Pay for Housing in the Last Year:      Number of Places Lived in the Last Year:      Unstable Housing in the Last Year:        Functional Status:  Prior to admission patient needed assistance:   Dependent ADLs:: Ambulation-walker, Ambulation-cane  Dependent IADLs:: Meal Preparation, Medication Management, Transportation       Mental Health Status:          Chemical Dependency Status:                Values/Beliefs:  Spiritual, Cultural Beliefs, Jain Practices, Values that affect care: yes          Values/Beliefs Comment:  (Shamanism)    Additional Information:  Per palliative and hospice, family/pt in agreement with going home on hospice. Family to transport if able. Otherwise, medical transport. Hospice working on availability for 8/14 start date.     SWCM reviewed previous admission for patient details. Hmong speaking. Pt is from home with son, who is PCA and primary contact for discharge planning. Pt has strong family support network and observes Shamanism practices. Palliative and hospice following. Contact son Barbara for discharge planning. CM following.     JCARLOS Gutierrez

## 2021-08-13 NOTE — CONSULTS
[facility]  Palliative Care Consultation Note    Patient: Beau Her  Date of Admission:  8/12/2021    Requesting Clinician / Team: Milton LUIS/[facility]  Reason for consult: Goals of care    A Hmong  was utilized for discussion with Beau and myself and Dr Pike via language line.    Recommendations:  SYMPTOMS:  Uremia:   - pruritis- start sarna lotion.  Refrain from hydroxyzine (would worsen fatigue)  - dry mouth- start artificial saliva, swabs w water prn.  - encephalopathy, metabolic (d/t uremia)     - no agitation currently.  Supportive cares.  -pain- given ESRD, would not use morphine (high likelihood of toxic metabolite accumulation and adverse effects).  Recommend very low dose hydromorphone with attention to renal dosing instead, used for pain and dyspnea PRN.  - myoclonus: for now, lorazepam prn muscle cramps or myoclonus  - nausea: use haloperidol (ordered)    GOALS OF CARE:   Beau had been clear in past conversations when decisional that she did NOT want to pursue hemodialysis.  She wishes to be able to return home to die, and to receive hospice and support to be at home and focus on treatments that provide comfort.   Code Status: admitted as full code; Beau and her family agree that given her desire to forego hemodialysis and acceptance of a natural death when it comes, she does not wish to receive CPR.  DNR/DNI entered with the assent of all.       SUPPORT: five living children (one in [place]) and grandchildren; lives with son Barbara who is also her PCA.     --follows shamanism spirituality   --palliative will follow with you   --consulted [facility] hospice for consult, family motivated to discharge home in next calendar day if possible if this may be arranged.    These recommendations have been discussed with Dr Pike, Dr Sifuentes and Beau and her family..      Thank you for the opportunity to participate in the care of this patient and family. Our team: will continue  to follow.     During regular M-F work hours -- if you are not sure who specifically to contact -- please contact us by calling [phone]       Assessments:  Beau Lam is an 80 yo Hmong-speaking woman of southeast  descent who presented to ED 8/12/21 for progressive fatigue, WHITTAKER and edema worsening over the past week.    Today, the patient was seen for:  Initial consult  Goals of care  Symptom management with uremia  Family meeting    Prognosis, Goals, & Planning:      Functional Status just prior to hospitalization: 3 (Capable of only limited self-care; needs help with ADLs; in bed/chair >50% of waking hours)  - had been able to ambulate some days with cane/support and feed herself.  Over the past two months, has had progressive fatigue and needing more assistance with ADLs.      Prognosis, Goals, and/or Advance Care Planning were addressed today: Yes        Summary/Comments:  Reviewed life expectancy in days-weeks      Patient's decision making preferences: independently          Patient has decision-making capacity today for complex decisions: Yes    Partial (needs assistance with complex decisions)  She was able to affirm her wish to NOT receive hemodialysis (since it would not help her live better, may help her live a couple months longer but likely to cause significant fatigue and side effects) and to proceed with treatments providing comfort and symptom relief.  She does not wish to have life-prolonging treatments.            I have concerns about the patient/family's health literacy today: No           Patient has a completed Health Care Directive: No.       Code status: No CPR / No Intubation    Coping, Meaning, & Spirituality:   Mood, coping, and/or meaning in the context of serious illness were addressed today: Yes  Summary/Comments:     Social:     Living situation: resides wtih son Barbara Benz and his family.    Key family / caregivers: children    Occupational history: retired    Substance use  history: no illicit substances    Current in-home services: PCA (family)  Had ten children, five have  and five survive.    History of Present Illness:  History gathered today from: patient, family/loved ones, medical chart, medical team members    Mao Her is an 80 yo Hmong-speaking woman of southeast  descent who presented to ED 21 for progressive fatigue, WHITTAKER and edema worsening over the past week.    She has history of advanced CKD (stage V), DM2, HTN, graves disease w ablation on chronic levothyroxine, chronic anemia, HLP, gerd, chronic pain, and COPD.  In visits with her nephrologist she has consistently expressed wish to forego hemodialysis.     Palliative is consulted today for goals of care.         Key Palliative Symptom Data:  # Pain severity the last 12 hours: none  # Dyspnea severity the last 12 hours: low  # Nausea severity the last 12 hours: none  # Anxiety severity the last 12 hours: none         ROS:  Comprehensive ROS is reviewed and is negative except as here & per HPI: denies CP, endorses itching, early satiety.  Endorses fatigue.     Past Medical History:  Past Medical History:   Diagnosis Date     Acute respiratory failure (H)      Allergic rhinitis      Asthma      Coronary artery disease      Diabetes (H)      Diabetes mellitus, type II (H)      Gout      Graves disease      HLD (hyperlipidemia)      Hypertension         Past Surgical History:  Past Surgical History:   Procedure Laterality Date     CHOLECYSTECTOMY       HYSTERECTOMY       OPEN REDUCTION INTERNAL FIXATION RODDING INTRAMEDULLARY TIBIA Left 2019    Procedure: LEFT DISTAL OPEN REDUCTION INTERNAL FIXATION INTERMEDULARY NAIL OF TIBIA AND FIBULA;  Surgeon: Manuel Bhatia MD;  Location: [facility];  Service: Orthopedics     PICC TRIPLE LUMEN PLACEMENT  2021              Family History:  Family History   Problem Relation Age of Onset     No Known Problems Mother      No Known Problems Father           Allergies:  No Known Allergies     Medications:  I have reviewed this patient's medication profile and medications from this hospitalization.   Noted:     APAP prn (no doses given)  Albuterol neb x1  Heparin subcutaneous  Got one dose IV morphine 0030 today while in ED    Home mEdication regimen:  APAP 1000mg TID  Low dose aspiring  lipitor 40mg qday  Calcium-d  lantus  Hydralazine  Levothyroxine  Omeprazole  Sodium bicarb tabs  Torsemide  Nifedipine  novolog    No chronic opioid therapies.  24hr OME: 4mg IV morphine = 10mg    Physical Exam:  Vital Signs: Temp: 98.7  F (37.1  C) Temp src: Oral BP: (!) 158/68 Pulse: 64   Resp: 26 SpO2: 95 % O2 Device: Nasal cannula Oxygen Delivery: 2 LPM  Weight: 121 lbs 4.05 oz  Somnolent 82 yo woman, supine in bed.  Lips dry, cracked.  Able to request warm water, warm blanket.  Speaks fluent Hmong.  Neck veins distended.  Lungs CTA scattered rhonchi  Heart S1S2 no m/g/r  abd soft, nontender.  extr edematous      Data reviewed:  Recent imaging reviewed, my comments on pertinents:   N/A    Recent lab data reviewed, my comments on pertinents:      Last Comprehensive Metabolic Panel:  Sodium   Date Value Ref Range Status   08/13/2021 140 136 - 145 mmol/L Final     Potassium   Date Value Ref Range Status   08/13/2021 5.4 (H) 3.5 - 5.0 mmol/L Final   08/13/2021 5.4 (H) 3.5 - 5.0 mmol/L Final     Chloride   Date Value Ref Range Status   08/13/2021 111 (H) 98 - 107 mmol/L Final     Carbon Dioxide (CO2)   Date Value Ref Range Status   08/13/2021 20 (L) 22 - 31 mmol/L Final     Anion Gap   Date Value Ref Range Status   08/13/2021 9 5 - 18 mmol/L Final     Glucose   Date Value Ref Range Status   08/13/2021 141 (H) 70 - 125 mg/dL Final     GLUCOSE BY METER POCT   Date Value Ref Range Status   08/13/2021 135 (H) 70 - 125 mg/dL Final     Urea Nitrogen   Date Value Ref Range Status   08/13/2021 66 (H) 8 - 28 mg/dL Final     Creatinine   Date Value Ref Range Status   08/13/2021 7.95 (HH) 0.60 -  1.10 mg/dL Final     GFR Estimate   Date Value Ref Range Status   08/13/2021 4 (L) >60 mL/min/1.73m2 Final     Comment:     As of July 11, 2021, eGFR is calculated by the CKD-EPI creatinine equation, without race adjustment. eGFR can be influenced by muscle mass, exercise, and diet. The reported eGFR is an estimation only and is only applicable if the renal function is stable.   03/30/2021 10 (L) >60 mL/min/1.73m2 Final     Calcium   Date Value Ref Range Status   08/13/2021 8.3 (L) 8.5 - 10.5 mg/dL Final     Bilirubin Total   Date Value Ref Range Status   08/12/2021 0.4 0.0 - 1.0 mg/dL Final     Alkaline Phosphatase   Date Value Ref Range Status   08/12/2021 112 45 - 120 U/L Final     ALT   Date Value Ref Range Status   08/12/2021 15 0 - 45 U/L Final     AST   Date Value Ref Range Status   08/12/2021 28 0 - 40 U/L Final       Lab Results   Component Value Date    WBC 11.0 08/12/2021     Lab Results   Component Value Date    RBC 3.46 08/12/2021     Lab Results   Component Value Date    HGB 9.7 08/12/2021     Lab Results   Component Value Date    HCT 29.7 08/12/2021     No components found for: MCT  Lab Results   Component Value Date    MCV 86 08/12/2021     Lab Results   Component Value Date    MCH 28.0 08/12/2021     Lab Results   Component Value Date    MCHC 32.7 08/12/2021     Lab Results   Component Value Date    RDW 14.5 08/12/2021     Lab Results   Component Value Date     08/12/2021          Total Visit Time: 110 minutes  o For Inpatient, 'Total Visit Time' = Unit Floor + Face-to-Face time.    Content of the Prolonged Time: time spent in chart review, discussion with Dr Pike and Dr Sifuentes, family meeting with the patient, sons Barbara and Narciso and Dr Pike and additional family meeting with Narciso Jimenez and Shannan (Eder) to discuss anticipatory guidance on end of life with renal failure, hospice program and needs at home, visitor policy and compassionate exception and other concerns.    MD NUBIA Arana  [facility], Hospice and Palliative Medicine Service  [phone] department number  Can page via [employer]

## 2021-08-13 NOTE — PROGRESS NOTES
Report called to 400, belongings list gone over and pt has all of her belongings she came in with , her gold necklace was confirmed to be with her daughter at home . Pt is comfort care now with the goal of going home on hospice tomorrow.

## 2021-08-13 NOTE — H&P
[facility]    History and Physical - Hospitalist Service       Date of Admission:  8/12/2021    Assessment & Plan      Beau Lam is a 81 year old female admitted on 8/12/2021. She has history of advanced chronic kidney disease stage V, type 2 diabetes, hypertension, hypothyroidism, chronic anemia, dyslipidemia, GERD, chronic pain, COPD.  She was brought to the emergency department for evaluation of progressive fatigue for 1 week, shortness of breath and edema.    1.  Acute kidney injury on chronic kidney disease stage V  2.  Volume overload  Creatinine in March 2021 4.16 - 4.42  Per nephrology emergency dialysis not needed tonight  IV Bumex 4 mg x 1  Monitor volume status  Nephrology and palliative care consult    3.  Hyperkalemia  Potassium 6.2 with some nonspecific peaked T waves  Treated with albuterol, calcium gluconate, dextrose, insulin, sodium bicarbonate  Potassium down to 5.2  Telemetry monitoring    4.  Hypertensive emergency  Systolic blood pressure 208-240 on arrival  Nitroglycerin drip ordered but not started  Continue IV hydralazine and IV labetalol as needed    5.  Type 2 diabetes mellitus with long-term insulin  Monitor with insulin sliding scale    6.  Hypothyroidism  On levothyroxine    7.  COPD  DuoNebs as needed     Diet: NPO for Medical/Clinical Reasons Except for: Meds    DVT Prophylaxis: Heparin SQ  Luevano Catheter: Not present  Central Lines: None  Code Status: Full Code      Clinically Significant Risk Factors Present on Admission              # Platelet Defect: home medication list includes an antiplatelet medication      Disposition Plan   Expected discharge: 2 days recommended to prior living arrangement once Volume status and hyperkalemia improved, nephrology and palliative care consultations.     The patient's care was discussed with the Patient's Family.    Antony Hawthorne MD  [facility]  Securely message with the Vocera Web Console  (learn more here)  Text page via [facility] Paging/Directory      ______________________________________________________________________    Chief Complaint   Fatigue, shortness of breath, edema    History is obtained from the electronic health record, emergency department physician and patient's son    History of Present Illness   Mao Her is a 81 year old female who was brought to the emergency department for evaluation of progressive fatigue for 1 week.  The information is obtained from Eedr the patient's son at bedside who served as .  Prior to ED evaluation, patient had decreased appetite and some nausea during the day.  She was noted short of breath and also had some edema.  No fevers, chills, chest pain, palpitations, vomiting or abdominal pain.  Patient is known to have history of renal failure and per nephrology was seen in the clinic after last hospitalization but had declined doing dialysis and family was looking into hospice.  Upon ED arrival, she was noted hypertensive.  Laboratory work-up showed hyperkalemia and EKG showed some nonspecific T waves.  She was treated with potassium shifting therapy.  When her son asked her about intubation and CPR she said now but the son wishes that if anything were to happen tonight at least CPR should be tried.    Review of Systems    Review of systems not obtained due to patient factors - critical condition    Past Medical History    I have reviewed this patient's medical history and updated it with pertinent information if needed.   Past Medical History:   Diagnosis Date     Acute respiratory failure (H)      Allergic rhinitis      Asthma      Coronary artery disease      Diabetes (H)      Diabetes mellitus, type II (H)      Gout      Graves disease      HLD (hyperlipidemia)      Hypertension        Past Surgical History   I have reviewed this patient's surgical history and updated it with pertinent information if needed.  Past Surgical History:   Procedure  Laterality Date     CHOLECYSTECTOMY       HYSTERECTOMY       OPEN REDUCTION INTERNAL FIXATION RODDING INTRAMEDULLARY TIBIA Left 8/21/2019    Procedure: LEFT DISTAL OPEN REDUCTION INTERNAL FIXATION INTERMEDULARY NAIL OF TIBIA AND FIBULA;  Surgeon: Manuel Bhatia MD;  [place] [facility] [place];  Service: Orthopedics     PICC TRIPLE LUMEN PLACEMENT  8/13/2021            Social History   I have reviewed this patient's social history and updated it with pertinent information if needed.  Social History     Tobacco Use     Smoking status: Never Smoker     Smokeless tobacco: Never Used   Substance Use Topics     Alcohol use: No     Drug use: No       Family History   I have reviewed this patient's family history and updated it with pertinent information if needed.  Family History   Problem Relation Age of Onset     No Known Problems Mother      No Known Problems Father        Prior to Admission Medications   Prior to Admission Medications   Prescriptions Last Dose Informant Patient Reported? Taking?   LANTUS SOLOSTAR U-100 INSULIN 100 unit/mL (3 mL) pen   No No   Sig: [LANTUS SOLOSTAR U-100 INSULIN 100 UNIT/ML (3 ML) PEN] Inject 10 Units under the skin daily with breakfast. 11.65 Type 2 with hyperglycemia Contact provider if insulin prescribed is not the preferred insulin per insurance.   NIFEdipine (PROCARDIA XL) 90 MG 24 hr tablet   No No   Sig: [NIFEDIPINE (PROCARDIA XL) 90 MG 24 HR TABLET] Take 1 tablet (90 mg total) by mouth daily.   NOVOLOG FLEXPEN U-100 INSULIN 100 unit/mL (3 mL) injection pen   No No   Sig: [NOVOLOG FLEXPEN U-100 INSULIN 100 UNIT/ML (3 ML) INJECTION PEN] Check blood sugar four (4) times daily.  11.65 Type 2 with hyperglycemia  BD Ultra-fine Ashley Pen Needles - NDC [address]-02 - dispense 1 case,  refill PRN for 1 year  Microlet Color Lancets (100s) - dispense 1, refill PRN for 1 year  Contour Next Test Strips (50's) - dispense 1, refill PRN for 1 year   acetaminophen (TYLENOL) 500 MG tablet    Yes No   Sig: [ACETAMINOPHEN (TYLENOL) 500 MG TABLET] Take 1,000 mg by mouth 3 (three) times a day.    aspirin 81 MG EC tablet   No No   Sig: [ASPIRIN 81 MG EC TABLET] Take 1 tablet (81 mg total) by mouth daily.   atorvastatin (LIPITOR) 40 MG tablet   Yes No   Sig: [ATORVASTATIN (LIPITOR) 40 MG TABLET] Take 40 mg by mouth daily.   calcium-vitamin D 500 mg(1,250mg) -200 unit per tablet   No No   Sig: [CALCIUM-VITAMIN D 500 MG(1,250MG) -200 UNIT PER TABLET] Take 1 tablet by mouth 2 (two) times a day.   fluticasone propionate (FLONASE) 50 mcg/actuation nasal spray   Yes No   Sig: [FLUTICASONE PROPIONATE (FLONASE) 50 MCG/ACTUATION NASAL SPRAY] Apply 1 spray into each nostril daily as needed for rhinitis.   hydrALAZINE (APRESOLINE) 25 MG tablet   No No   Sig: [HYDRALAZINE (APRESOLINE) 25 MG TABLET] Take 1 tablet (25 mg total) by mouth 3 (three) times a day.   latanoprost (XALATAN) 0.005 % ophthalmic solution   Yes No   Sig: [LATANOPROST (XALATAN) 0.005 % OPHTHALMIC SOLUTION] Administer 1 drop to both eyes at bedtime.   levothyroxine (SYNTHROID, LEVOTHROID) 125 MCG tablet   No No   Sig: [LEVOTHYROXINE (SYNTHROID, LEVOTHROID) 125 MCG TABLET] Take 1 tablet (125 mcg total) by mouth Daily at 6:00 am.   omeprazole (PRILOSEC) 20 MG capsule   No No   Sig: [OMEPRAZOLE (PRILOSEC) 20 MG CAPSULE] Take 1 capsule (20 mg total) by mouth daily before breakfast.   polyvinyl alcohol (LIQUIFILM TEARS) 1.4 % ophthalmic solution   Yes No   Sig: [POLYVINYL ALCOHOL (LIQUIFILM TEARS) 1.4 % OPHTHALMIC SOLUTION] Administer 1 drop to both eyes as needed for dry eyes.   sodium bicarbonate 650 MG tablet   Yes No   Sig: [SODIUM BICARBONATE 650 MG TABLET] Take 1,300 mg by mouth 2 (two) times a day.    torsemide (DEMADEX) 100 MG tablet   No No   Sig: [TORSEMIDE (DEMADEX) 100 MG TABLET] Take 1 tablet (100 mg total) by mouth daily.      Facility-Administered Medications: None     Allergies   No Known Allergies    Physical Exam   Vital Signs: Temp: 99.1   F (37.3  C) Temp src: Temporal BP: (!) 182/65 Pulse: 85   Resp: 24 SpO2: 97 %      Weight: 135 lbs 0 oz    General: Appears chronically ill  HEENT: Normocephalic, traumatic  Neck: Nontender, no JVD  Heart: Systolic murmur, regular rate  Lungs: Bilateral crackles  Abdomen: Soft, nontender  Extremities: Nonpitting edema, nontender  Skin: Warm, dry  Neuro: Awake, interactive with son, moves all 4 extremities  Psychiatric: Pleasant and cooperative    Data   Data reviewed today: I reviewed all medications, new labs and imaging results over the last 24 hours. I personally reviewed the EKG tracing showing Sinus rhythm at 71 bpm, peaked T wave V1-3.    Recent Labs   Lab 08/13/21  0215 08/13/21  0113 08/12/21  2359 08/12/21  2345 08/12/21  2231   WBC  --   --   --   --  11.0   HGB  --   --   --   --  9.7*   MCV  --   --   --   --  86   PLT  --   --   --   --  108*   INR  --  1.06  --   --   --    NA  --   --   --   --  137   POTASSIUM  --  5.2*  --   --  6.2*   CHLORIDE  --   --   --   --  111*   CO2  --   --   --   --  16*   BUN  --   --   --   --  73*   CR  --   --   --   --  7.91*   ANIONGAP  --   --   --   --  10   BRIANDA  --   --   --   --  8.4*   *  --  196* 239* 108   ALBUMIN  --   --   --   --  2.7*   PROTTOTAL  --   --   --   --  6.6   BILITOTAL  --   --   --   --  0.4   ALKPHOS  --   --   --   --  112   ALT  --   --   --   --  15   AST  --   --   --   --  28     No results found for this or any previous visit (from the past 24 hour(s)).

## 2021-08-13 NOTE — CONSULTS
NEPHROLOGY CONSULTATION      ASSESSMENT/PLAN:  80-year-old female with history of CKD 3, DM, history of Graves' disease, CKD 5 with baseline creatinine around 4-5, renal hypertension, admitted with uremic symptoms and hypervolemia with HTN urgency. Nephrology consulted for RUDOLPH on advanced CKD and hyperkalemia     RUDOLPH  nonoliguric  Creatinine has been in 4s since last admission   Chronic kidney disease stage 5   Likely secondary to diabetic nephropathy  Unclear if she has an RUDOLPH or just progression of her CKD that has been rapidly progressing over the past year from her baseline being in less than 3 range more recently this year has been around mid 4  Nephrotic range proteinuria with more than 15 g/g of proteins  Paraproteins negative.   Imaging negative for hydro, showed mildly echogenic kidneys.   --With her low kidney functions and progressive decline secondary to diabetes at this point she she has kidney function in the range of needing dialysis support if life-prolonging therapies are considered  --She reportedly does not want dialysis, has had multiple discussion since 03/2021 and was referred for hospice in  in 5/202. She was able to understand the consequences with that decision, she is still contemplating the burden it will hav on her QOL.   -- appears uremic with encephalopathy , myoclonus , hypervolemia and hyperkalemia  -- detailed discussions with sons at bedside and Mao regarding dialysis likely being permanent and not improve her QOL and her condition being not reversible with dialysis and she was able to repeat her wishes to not consider it if it cannot fix her   -- will support palliative measures at this time and follow peripherally for any additional concerns     Renal hypertension -   Keep BP >150/90   meds are more for comfort can consider torsemide if breathing is an issue     Hypervolemia - Volume up on exam   Had a good response to diuresis  -- comfortable on NC O2     Anemia -  Hemoglobin stable     Metabolic acidosis - PTA oral bicarbonate     Diabetes mellitus type II - With history of poor control but more recently A1c <7%  --Hemoglobin A1c 6.7 this admission     Coronary artery disease - On aspirin, statin     CKD-MBD - In February calcium and phosphorus were in range, phosphorus elevated this admission. Calcium corrects to ~9, will start Tums with meals as binder     Hypothyroidism - On replacement,    At this point with hospice decisions will follow peripherally    Thank you for the consultation    Nisha Pike MD  [facility]  [phone]       CC:ESKD , with progression , RUDOLPH    REASON FOR CONSULTATION: We are asked to see pt by Dr Hawthorne    HISTORY OF PRESENT ILLNESS:81 year old female  80-year-old female with history of CKD 3, DM, history of Graves' disease, CKD 5 with baseline creatinine around 4-5, renal hypertension, admitted with uremic symptoms and hypervolemia with HTN urgency. Nephrology consulted for RUDOLPH on advanced CKD and hyperkalemia  She has been seen twice in office for advanced CKD w progression LV in 5/2021.  She has been consistent with plans for hospice when condition progresses, was not set up since she felt OK.  Presented with worsened encephalopathy, resp failure , hypervolemia and HTN urgency. Accompanied by her sons , seen with Palliative  She is very encephalopathic and reports just dry mouth  Discussed plan of care with phone       REVIEW OF SYSTEMS:  ROS was completely reviewed and otherwise negative and non-contributory    Past Medical History:   Diagnosis Date     Acute respiratory failure (H)      Allergic rhinitis      Asthma      Coronary artery disease      Diabetes (H)      Diabetes mellitus, type II (H)      Gout      Graves disease      HLD (hyperlipidemia)      Hypertension        Social History     Socioeconomic History     Marital status:      Spouse name: Not on file     Number of children: Not on file      Years of education: Not on file     Highest education level: Not on file   Occupational History     Not on file   Tobacco Use     Smoking status: Never Smoker     Smokeless tobacco: Never Used   Substance and Sexual Activity     Alcohol use: No     Drug use: No     Sexual activity: Not on file   Other Topics Concern     Not on file   Social History Narrative    Patient uses walker at home. Patient is not on supplemental O2 at home. Patient would like to be DNR.      Social Determinants of Health     Financial Resource Strain:      Difficulty of Paying Living Expenses:    Food Insecurity:      Worried About Running Out of Food in the Last Year:      Ran Out of Food in the Last Year:    Transportation Needs:      Lack of Transportation (Medical):      Lack of Transportation (Non-Medical):    Physical Activity:      Days of Exercise per Week:      Minutes of Exercise per Session:    Stress:      Feeling of Stress :    Social Connections:      Frequency of Communication with Friends and Family:      Frequency of Social Gatherings with Friends and Family:      Attends Judaism Services:      Active Member of Clubs or Organizations:      Attends Club or Organization Meetings:      Marital Status:    Intimate Partner Violence:      Fear of Current or Ex-Partner:      Emotionally Abused:      Physically Abused:      Sexually Abused:        Family History   Problem Relation Age of Onset     No Known Problems Mother      No Known Problems Father        No Known Allergies    MEDICATIONS:    sodium chloride 0.9%  250 mL Intravenous Once in dialysis/CRRT     sodium chloride 0.9%  300 mL Hemodialysis Machine Once     heparin (porcine)  500 Units Hemodialysis Machine OR IV Push Once in dialysis/CRRT     heparin ANTICOAGULANT  5,000 Units Subcutaneous Q12H     hydrALAZINE  10 mg Intravenous Once     insulin aspart  1-6 Units Subcutaneous Q4H     sodium chloride (PF)  3 mL Intracatheter Q8H         PHYSICAL EXAM    BP (!)  "165/72   Pulse 61   Temp 98.7  F (37.1  C) (Oral)   Resp 17   Ht 1.346 m (4' 5\")   Wt 55 kg (121 lb 4.1 oz)   SpO2 96%   BMI 30.35 kg/m        Intake/Output Summary (Last 24 hours) at 8/13/2021 0952  Last data filed at 8/13/2021 0800  Gross per 24 hour   Intake 80 ml   Output 600 ml   Net -520 ml       sleepy and NAD  HEENT NC/AT; perrla; OP clear without lesions; mmm  Neck supple without LAD, TM  CV; RRR without rub or murmur  Lung: coarse  Ab: soft and NT; not distended; normal bs  Ext: +edema and well perfused  Skin; no rash  Neuro; grossly encephalopathic    LABORATORIES    Recent Labs   Lab 08/12/21  2231   WBC 11.0   HGB 9.7*   HCT 29.7*   *     Recent Labs   Lab 08/13/21  0521 08/12/21  2231    137   CO2 20* 16*   BUN 66* 73*   ALKPHOS  --  112   ALT  --  15   AST  --  28     Recent Labs   Lab 08/13/21  0113   INR 1.06     Invalid input(s): FERRITIN  No results for input(s): IRON in the last 168 hours.    Invalid input(s): TIBC    I reviewed all labs and imaging    Thank you for the consultation we will follow    Nisha Pike MD  [facility]  [phone]    "

## 2021-08-13 NOTE — ED PROVIDER NOTES
EMERGENCY DEPARTMENT ENCOUNTER     NAME: Beau Her   AGE: 81 year old female   YOB: 1940   MRN: [id number]   EVALUATION DATE & TIME: 8/12/2021  9:51 PM   PCP: Ty Martinez     Chief Complaint   Patient presents with     Fatigue   :    FINAL IMPRESSION       1. Hyperkalemia    2. Acute renal failure, unspecified acute renal failure type (H)    3. Hypertensive emergency           ED COURSE & MEDICAL DECISION MAKING      Pertinent Labs & Imaging studies reviewed. (See chart for details)   81 year old female  presents to the Emergency Department for evaluation of fatigue. Patient is mung speaking, has a history of CKD. Initial Vitals Reviewed. Initial exam notable for somewhat ill-appearing patient who has blood pressures of 240 systolic, appears short of breath, and has pitting edema of lower extremities. I was suspicious of acute pathology including something like worsening renal failure, CHF, fluid overload in the lungs, COVID-19. EKG was brought to me and immediately I noticed peaked T waves which were not present on the previous EKG in March and are highly concerning for worsening renal failure with new hyperkalemia and a dialysis need. In the setting of concern for new worsened endorgan injury it actually would be consistent with hypertensive emergency so I started with IV hydralazine boluses. Unfortunately this did not help her blood pressure much and she was still 240 systolic, so after seeing her BNP is also 1500, her creatinine which was previously 4 is now almost 8, and her potassium of 6.2 now appears to be dialysis dependent requiring acute management, I started her on a nitroglycerin drip for blood pressure control for hypertensive emergency. I have ordered shifting of her potassium with calcium due to the EKG changes, insulin dextrose therapy, and bicarb. She is not a candidate for Lasix due to her renal function. I do think she needs emergent dialysis and does not currently have a dialysis  line. Case discussed with nephrology, intensivist, and hospitalist and she requires ICU management at this time.        10:01 PM I met with patient for initial interview and exam.   11:26 PM Spoke with Dr. San, intensivist, about patient case.  11:37 PM Spoke with Dr. Hawthorne, hospitalist, about the case. They accepted the admission.   12:12 AM Spoke with nephrology about patient case.     At the conclusion of the encounter I discussed the results of all of the tests and the disposition. The questions were answered. The patient or family acknowledged understanding and was agreeable with the care plan.     60 minutes critical care time, see procedure note below for details if relevant    MEDICATIONS GIVEN IN THE EMERGENCY:   Medications   glucose gel 15-30 g (has no administration in time range)     Or   dextrose 50 % injection 25-50 mL (has no administration in time range)     Or   glucagon injection 1 mg (has no administration in time range)   calcium gluconate 1 g in 50 mL sodium chloride intermittent infusion (premix) (1 g Intravenous Given 8/12/21 2350)   dextrose 10% infusion (has no administration in time range)   hydrALAZINE (APRESOLINE) injection 10 mg (has no administration in time range)   nitroGLYcerin 50 mg in D5W 250 mL (adult std) infusion CENTRAL (has no administration in time range)   lidocaine 1 % 0.1-5 mL (has no administration in time range)   lidocaine (LMX4) cream (has no administration in time range)   sodium chloride (PF) 0.9% PF flush 10-40 mL (has no administration in time range)   hydrALAZINE (APRESOLINE) injection 10 mg (10 mg Intravenous Given 8/12/21 2234)   LORazepam (ATIVAN) injection 1 mg (1 mg Intravenous Given 8/12/21 2300)   sodium bicarbonate 8.4 % injection 50 mEq (50 mEq Intravenous Given 8/12/21 2330)   dextrose 50 % injection 25 g (25 g Intravenous Given 8/12/21 2330)   insulin (regular) (HumuLIN R/NovoLIN R) injection 6 Units (6 Units Intravenous Given 8/12/21 2331)    albuterol (PROVENTIL) neb solution 10 mg (10 mg Nebulization Given 8/12/21 7126)      NEW PRESCRIPTIONS STARTED AT TODAY'S ER VISIT   New Prescriptions    No medications on file     ================================================================   HISTORY OF PRESENT ILLNESS       Patient information was obtained from: Patient    Use of Intrepreter: Yes (Family member) Yessy Lam is a 81 year old female with history of anemia, COPD, CKD, hypertension, SIRS, Graves' disease, and type 2 diabetes mellitus who presents for evaluation of fatigue.    Patient reports 1 week of fatigue and feeling tired, worse today. She also endorses leg swelling and shortness of breath, but denies any cough or vomiting. She is vaccinated against COVID. She has no other complaints at this time.     ================================================================    REVIEW OF SYSTEMS       Review of Systems   Constitutional: Positive for fatigue. Negative for fever.   HENT: Negative for congestion.    Eyes: Negative for redness.   Respiratory: Positive for shortness of breath. Negative for cough.    Cardiovascular: Positive for leg swelling. Negative for chest pain.   Gastrointestinal: Negative for abdominal pain and vomiting.   Genitourinary: Negative for difficulty urinating.   Musculoskeletal: Negative for arthralgias and neck stiffness.   Skin: Negative for color change.   Neurological: Negative for headaches.   Psychiatric/Behavioral: Negative for confusion.         PAST HISTORY     PAST MEDICAL HISTORY:   Past Medical History:   Diagnosis Date     Acute respiratory failure (H)      Allergic rhinitis      Asthma      Coronary artery disease      Diabetes (H)      Diabetes mellitus, type II (H)      Gout      Graves disease      HLD (hyperlipidemia)      Hypertension       PAST SURGICAL HISTORY:   Past Surgical History:   Procedure Laterality Date     CHOLECYSTECTOMY       HYSTERECTOMY       OPEN REDUCTION INTERNAL FIXATION  RODDING INTRAMEDULLARY TIBIA Left 8/21/2019    Procedure: LEFT DISTAL OPEN REDUCTION INTERNAL FIXATION INTERMEDULARY NAIL OF TIBIA AND FIBULA;  Surgeon: Manuel Bhatia MD;  [place] [facility] [place]: [place]opedics      CURRENT MEDICATIONS:   acetaminophen (TYLENOL) 500 MG tablet  aspirin 81 MG EC tablet  atorvastatin (LIPITOR) 40 MG tablet  calcium-vitamin D 500 mg(1,250mg) -200 unit per tablet  fluticasone propionate (FLONASE) 50 mcg/actuation nasal spray  hydrALAZINE (APRESOLINE) 25 MG tablet  LANTUS SOLOSTAR U-100 INSULIN 100 unit/mL (3 mL) pen  latanoprost (XALATAN) 0.005 % ophthalmic solution  levothyroxine (SYNTHROID, LEVOTHROID) 125 MCG tablet  NIFEdipine (PROCARDIA XL) 90 MG 24 hr tablet  NOVOLOG FLEXPEN U-100 INSULIN 100 unit/mL (3 mL) injection pen  omeprazole (PRILOSEC) 20 MG capsule  polyvinyl alcohol (LIQUIFILM TEARS) 1.4 % ophthalmic solution  sodium bicarbonate 650 MG tablet  torsemide (DEMADEX) 100 MG tablet      ALLERGIES:   No Known Allergies   FAMILY HISTORY:   Family History   Problem Relation Age of Onset     No Known Problems Mother      No Known Problems Father       SOCIAL HISTORY:   Social History     Socioeconomic History     Marital status:      Spouse name: Not on file     Number of children: Not on file     Years of education: Not on file     Highest education level: Not on file   Occupational History     Not on file   Tobacco Use     Smoking status: Never Smoker     Smokeless tobacco: Never Used   Substance and Sexual Activity     Alcohol use: No     Drug use: No     Sexual activity: Not on file   Other Topics Concern     Not on file   Social History Narrative    Patient uses walker at home. Patient is not on supplemental O2 at home. Patient would like to be DNR.      Social Determinants of Health     Financial Resource Strain:      Difficulty of Paying Living Expenses:    Food Insecurity:      Worried About Running Out of Food in the Last Year:      Ran Out of  "Food in the Last Year:    Transportation Needs:      Lack of Transportation (Medical):      Lack of Transportation (Non-Medical):    Physical Activity:      Days of Exercise per Week:      Minutes of Exercise per Session:    Stress:      Feeling of Stress :    Social Connections:      Frequency of Communication with Friends and Family:      Frequency of Social Gatherings with Friends and Family:      Attends Buddhist Services:      Active Member of Clubs or Organizations:      Attends Club or Organization Meetings:      Marital Status:    Intimate Partner Violence:      Fear of Current or Ex-Partner:      Emotionally Abused:      Physically Abused:      Sexually Abused:         VITALS  Patient Vitals for the past 24 hrs:   BP Temp Temp src Pulse Resp SpO2 Height Weight   08/12/21 2225 (!) 240/92 -- -- 72 28 96 % -- --   08/12/21 2117 (!) 208/88 -- -- -- -- -- -- --   08/12/21 2104 -- 99.1  F (37.3  C) Temporal 69 20 96 % 1.346 m (4' 5\") 61.2 kg (135 lb)        ================================================================    PHYSICAL EXAM     VITAL SIGNS: BP (!) 240/92   Pulse 72   Temp 99.1  F (37.3  C) (Temporal)   Resp 28   Ht 1.346 m (4' 5\")   Wt 61.2 kg (135 lb)   SpO2 96%   BMI 33.79 kg/m     Constitutional:  Awake, no acute distress   HENT:  Atraumatic, oropharynx without exudate or erythema, membranes moist  Lymph:  No adenopathy  Eyes: EOM intact, PERRL, no injection  Neck: Supple  Respiratory:  Clear to auscultation bilaterally, no wheezes or crackles. Dyspneic.   Cardiovascular:  Regular rate and rhythm, single S1 and S2   GI:  Soft, nontender, nondistended, no rebound or guarding   Musculoskeletal:  Moves all extremities, no deformities. 1+ pitting edema.    Skin:  Warm, dry  Neurologic:  Alert and oriented x3, no focal deficits noted       ================================================================  LAB       All pertinent labs reviewed and interpreted.   Labs Ordered and Resulted from " Time of ED Arrival Up to the Time of Departure from the ED   COMPREHENSIVE METABOLIC PANEL - Abnormal; Notable for the following components:       Result Value    Potassium 6.2 (*)     Chloride 111 (*)     Carbon Dioxide (CO2) 16 (*)     Urea Nitrogen 73 (*)     Creatinine 7.91 (*)     Calcium 8.4 (*)     Albumin 2.7 (*)     GFR Estimate 4 (*)     All other components within normal limits   ROUTINE UA WITH MICROSCOPIC REFLEX TO CULTURE - Abnormal; Notable for the following components:    Glucose Urine 100  (*)     Blood Urine 0.06 mg/dL (*)     Protein Albumin Urine 300  (*)     Bacteria Urine Few (*)     Mucus Urine Present (*)     RBC Urine 4 (*)     All other components within normal limits    Narrative:     Urine Culture not indicated   CBC WITH PLATELETS AND DIFFERENTIAL - Abnormal; Notable for the following components:    RBC Count 3.46 (*)     Hemoglobin 9.7 (*)     Hematocrit 29.7 (*)     Platelet Count 108 (*)     All other components within normal limits   B-TYPE NATRIURETIC PEPTIDE ([facility] ONLY) - Abnormal; Notable for the following components:    BNP 1,547 (*)     All other components within normal limits   GLUCOSE BY METER - Abnormal; Notable for the following components:    GLUCOSE BY METER POCT 239 (*)     All other components within normal limits   TROPONIN I - Normal   MAGNESIUM - Normal   SARS-COV2 (COVID-19) VIRUS RT-PCR - Normal    Narrative:     Testing was performed using the memo  SARS-CoV-2 & Influenza A/B Assay on the memo  [employer]  System.  This test should be ordered for the detection of SARS-COV-2 in individuals who meet SARS-CoV-2 clinical and/or epidemiological criteria. Test performance is unknown in asymptomatic patients.  This test is for in vitro diagnostic use under the FDA EUA for laboratories certified under CLIA to perform moderate and/or high complexity testing. This test has not been FDA cleared or approved.  A negative test does not rule out the presence of PCR inhibitors in  the specimen or target RNA in concentration below the limit of detection for the assay. The possibility of a false negative should be considered if the patient's recent exposure or clinical presentation suggests COVID-19.  [facility] are certified under the Clinical Laboratory Improvement Amendments of 1988 (CLIA-88) as qualified to perform moderate and/or high complexity laboratory testing.   GLUCOSE BY METER - Normal   CBC WITH PLATELETS & DIFFERENTIAL    Narrative:     The following orders were created for panel order CBC with platelets differential.  Procedure                               Abnormality         Status                     ---------                               -----------         ------                     CBC with platelets and d...[[id number]]  Abnormal            Final result                 Please view results for these tests on the individual orders.   GLUCOSE MONITOR NURSING POCT   POTASSIUM   POTASSIUM   PERIPHERAL IV CATHETER   CALL   CARDIAC CONTINUOUS MONITORING   ASSESS FOR HYPOGLYCEMIA SYMPTOMS   NOTIFY PHYSICIAN   CALL   PATIENT CARE ORDER   DRESSING   NO   MEASURE AND RECORD   MEASURE AND RECORD   DISCHARGE PLANNING   COVID-19 VIRUS (CORONAVIRUS) BY PCR    Narrative:     The following orders were created for panel order Symptomatic COVID-19 Virus (Coronavirus) by PCR Nasopharyngeal.  Procedure                               Abnormality         Status                     ---------                               -----------         ------                     SARS-COV2 (COVID-19) Vir...[[id number]]  Normal              Final result                 Please view results for these tests on the individual orders.        ===============================================================  RADIOLOGY       Reviewed all pertinent imaging. Please see official radiology report.   XR Chest 2 Views    (Results Pending)          ================================================================  EKG       EKG reviewed interpreted by me shows sinus rhythm with rate of 71, normal axis,  with obvious peaked T waves in leads V2 through V4 that are changed since March  I have independently reviewed and interpreted the EKG(s) documented above.     ================================================================  PROCEDURES     Critical care time performed 60 minutes, spent examining patient, reviewing laboratory and imaging data, discussing with intensivist, hospitalist, nephrologist, starting management of acute hyperkalemia, hypertensive emergency    I, Emmanuel Adhikari, am serving as a scribe to document services personally performed by Dr. Pinedo based on my observation and the provider's statements to me. I, Kenzie Pinedo MD attest that Emmanuel dAhikari is acting in a scribe capacity, has observed my performance of the services and has documented them in accordance with my direction.   Kenzie Pinedo M.D.   Emergency Medicine   [facility] EMERGENCY DEPARTMENT  [address] [place] [id number]  [phone]  Dept: [phone]       Kenzie Pinedo MD  08/16/21 1400

## 2021-08-13 NOTE — CONSULTS
Met with melanie Stuart in pt's room, pt sleeping. Reviewed hospice program, philosophy and services and choice of agency. Per Narciso family is in agreement with hospice and using [facility]. They would like to take pt home as soon as possible. Family believes they will be able to transport home by car. Hospice will be able to see pt at home tomorrow 8/14/21 at 1pm. Ordered hospital bed with 1/2 rails, OBT, oxygen, transport wheelchair and commode to be delivered today 8/13/21. Request discharging MD to order hospice comfort set and send home with patient. (hospice will order hydromorphone to be delivered to pt home. No script needed just put on discharge orders). Called melanie Gaines and Barbara to review information as above. They are in agreement with plan if pt appropriate for discharge. Updated Dr Bear Min CM and Luh GAMING.     Thank you for this referral and opportunity to work with this family and team.     Matilde Glynn RN BSN PHN PN  Hospice Referral Specialist  [place] [phone]  [email or web address]

## 2021-08-13 NOTE — PROGRESS NOTES
Renal     D/w ER staff.   ESRD volume overload and severe HTN  K mildly elevated and acidotic.   Pt with hx of severe hyperk.   Will arrange urgent dialysis.     Marysol Wyatt MD  [facility]  [phone]

## 2021-08-13 NOTE — ED PROVIDER NOTES
"ED SIGNOUT  Date/Time:8/13/2021 1:33 AM    ED Course/MDM:    12:40 AM signout accepted from Dr. Pinedo.  Prior records were reviewed.  Labs, x-ray, CT, EKG from this visit are reviewed.    1:10 AM went to speak with patient and family.  They have some questions about dialysis.  It sounds like she is concerned about pain with placement of the dialysis catheter as she thought her IV was quite painful.  We discussed that this would be a painful procedure but her pain could be alleviated with local infiltration of medication and also possibly a bit of sedation through the IV depending on provider comfort.  They are agreeable to this.  We also discussed progression of her renal disease.  Review of the chart shows that she has had predictable aggression of chronic renal failure and that the current renal failure and hyperkalemia may be reversible with dialysis and aggressive treatment of her other comorbidities but that she may not improve and may need long-term dialysis.  It does not sound like she is interested in long-term dialysis although it is a little hard to tell because she does not pay much attention during conversations even with family interpreting.  She is agreeable and family would like to do short-term dialysis for emergent stabilization and then make further decisions based on clinical course.  It sounds like hospice has been discussed with them in the past due to her end-stage renal disease and multiple comorbidities.      DIAGNOSIS  1. Hyperkalemia    2. Acute renal failure, unspecified acute renal failure type (H)    3. Hypertensive emergency        New Prescriptions    No medications on file       HPI    See Dr. Pinedo note    Physical Exam:  BP (!) 182/65   Pulse 85   Temp 99.1  F (37.3  C) (Temporal)   Resp 24   Ht 1.346 m (4' 5\")   Wt 61.2 kg (135 lb)   SpO2 97%   BMI 33.79 kg/m    Physical Exam  Vitals and nursing note reviewed.   Constitutional:       Appearance: Normal appearance. "   HENT:      Head: Normocephalic and atraumatic.      Right Ear: External ear normal.      Left Ear: External ear normal.      Nose: Nose normal.   Eyes:      Extraocular Movements: Extraocular movements intact.      Conjunctiva/sclera: Conjunctivae normal.   Pulmonary:      Effort: Pulmonary effort is normal.   Musculoskeletal:         General: Normal range of motion.      Cervical back: Normal range of motion.      Right lower leg: No edema.      Left lower leg: No edema.   Skin:     General: Skin is warm and dry.   Neurological:      General: No focal deficit present.      Mental Status: She is alert and oriented to person, place, and time. Mental status is at baseline.   Psychiatric:         Mood and Affect: Mood normal.         Behavior: Behavior normal.         Thought Content: Thought content normal.          Results for orders placed or performed during the hospital encounter of 08/12/21   Comprehensive metabolic panel   Result Value Ref Range    Sodium 137 136 - 145 mmol/L    Potassium 6.2 (HH) 3.5 - 5.0 mmol/L    Chloride 111 (H) 98 - 107 mmol/L    Carbon Dioxide (CO2) 16 (L) 22 - 31 mmol/L    Anion Gap 10 5 - 18 mmol/L    Urea Nitrogen 73 (H) 8 - 28 mg/dL    Creatinine 7.91 (HH) 0.60 - 1.10 mg/dL    Calcium 8.4 (L) 8.5 - 10.5 mg/dL    Glucose 108 70 - 125 mg/dL    Alkaline Phosphatase 112 45 - 120 U/L    AST 28 0 - 40 U/L    ALT 15 0 - 45 U/L    Protein Total 6.6 6.0 - 8.0 g/dL    Albumin 2.7 (L) 3.5 - 5.0 g/dL    Bilirubin Total 0.4 0.0 - 1.0 mg/dL    GFR Estimate 4 (L) >60 mL/min/1.73m2   Result Value Ref Range    Troponin I 0.02 0.00 - 0.29 ng/mL   Result Value Ref Range    Magnesium 2.5 1.8 - 2.6 mg/dL   UA with Microscopic reflex to Culture    Specimen: Urine, Clean Catch   Result Value Ref Range    Color Urine Colorless Colorless, Straw, Light Yellow, Yellow    Appearance Urine Clear Clear    Glucose Urine 100  (A) Negative mg/dL    Bilirubin Urine Negative Negative    Ketones Urine Negative  Negative mg/dL    Specific Gravity Urine 1.010 1.001 - 1.030    Blood Urine 0.06 mg/dL (A) Negative    pH Urine 7.0 5.0 - 7.0    Protein Albumin Urine 300  (A) Negative mg/dL    Urobilinogen Urine <2.0 <2.0 mg/dL    Nitrite Urine Negative Negative    Leukocyte Esterase Urine Negative Negative    Bacteria Urine Few (A) None Seen /HPF    Mucus Urine Present (A) None Seen /LPF    RBC Urine 4 (H) <=2 /HPF    WBC Urine 5 <=5 /HPF    Squamous Epithelials Urine 1 <=1 /HPF    Hyaline Casts Urine 2 <=2 /LPF   CBC with platelets and differential   Result Value Ref Range    WBC Count 11.0 4.0 - 11.0 10e3/uL    RBC Count 3.46 (L) 3.80 - 5.20 10e6/uL    Hemoglobin 9.7 (L) 11.7 - 15.7 g/dL    Hematocrit 29.7 (L) 35.0 - 47.0 %    MCV 86 78 - 100 fL    MCH 28.0 26.5 - 33.0 pg    MCHC 32.7 31.5 - 36.5 g/dL    RDW 14.5 10.0 - 15.0 %    Platelet Count 108 (L) 150 - 450 10e3/uL    % Neutrophils 65 %    % Lymphocytes 28 %    % Monocytes 5 %    % Eosinophils 2 %    % Basophils 0 %    % Immature Granulocytes 0 %    NRBCs per 100 WBC 0 <1 /100    Absolute Neutrophils 7.1 1.6 - 8.3 10e3/uL    Absolute Lymphocytes 3.1 0.8 - 5.3 10e3/uL    Absolute Monocytes 0.6 0.0 - 1.3 10e3/uL    Absolute Eosinophils 0.2 0.0 - 0.7 10e3/uL    Absolute Basophils 0.0 0.0 - 0.2 10e3/uL    Absolute Immature Granulocytes 0.0 <=0.0 10e3/uL    Absolute NRBCs 0.0 10e3/uL   B-Type Natriuretic Peptide ([facility] Only)   Result Value Ref Range    BNP 1,547 (H) 0 - 167 pg/mL   SARS-COV2 (COVID-19) Virus RT-PCR    Specimen: Nasopharyngeal; Swab   Result Value Ref Range    SARS CoV2 PCR Negative Negative   Glucose by meter   Result Value Ref Range    GLUCOSE BY METER POCT 103 70 - 125 mg/dL   Result Value Ref Range    Potassium 5.2 (H) 3.5 - 5.0 mmol/L   Glucose by meter   Result Value Ref Range    GLUCOSE BY METER POCT 239 (H) 70 - 125 mg/dL   Glucose by meter   Result Value Ref Range    GLUCOSE BY METER POCT 196 (H) 70 - 125 mg/dL   Result Value Ref Range    INR 1.06  0.85 - 1.15       No results found.    Myron Chávez M.D.  [facility] Emergency Department       Myron Chávez MD  08/13/21 0135

## 2021-08-13 NOTE — PLAN OF CARE
Problem: Pain Acute  Goal: Acceptable Pain Control and Functional Ability  Intervention: Prevent or Manage Pain  Recent Flowsheet Documentation  Taken 8/13/2021 0400 by Deanne Churchill RN  Sensory Stimulation Regulation:   auditory stimulation minimized   care clustered   quiet environment promoted  Sleep/Rest Enhancement:   awakenings minimized   noise level reduced   regular sleep/rest pattern promoted   room darkened  Medication Review/Management: medications reviewed  Taken 8/13/2021 0300 by Deanne Churchill RN  Sensory Stimulation Regulation:   auditory stimulation minimized   care clustered   quiet environment promoted  Sleep/Rest Enhancement:   awakenings minimized   noise level reduced   regular sleep/rest pattern promoted   room darkened  Medication Review/Management: medications reviewed     Problem: Electrolyte Imbalance  Goal: Electrolyte Balance  Outcome: Improving

## 2021-08-13 NOTE — PROGRESS NOTES
Renal   hosp chart and outpt nephrology chart reviewed.   Case d/w Dr Childs.    Pt last hosp in March.   Since then has been seen twice in our clinic, last seen in May. At that time pt with her family present and supportive,  stated she did not want dialysis and the plan was for hospice. Ceci Coburn NP in our clinic discussed pts wishes with her PMD and also contacted [facility] to refer to hospice. She has not been seen in our clinic since May.  At that time was even complaining about the difficulty swallowing pills and was struggling to take her medications finding it burdensome.  Suspect medication non compliance contributing to high BP tonight.     She now has progressive CKD, rising creatinine and associated electrolyte abnl. Her K however  is better after temporizing measures. Her blood pressure is moderating with medication. She is on RA.   She does not need emergent dialysis initiation currently.  I suggest giving some iv bumex and bicarb to help stabilize lytes and BP and clarify with family if she enrolled in hospice and if wishes are still for hospice to pursue comfort focused care.   My partner Dr Pike will see pt later this am.     Marysol Wyatt MD  [facility]  [phone]

## 2021-08-14 VITALS
HEART RATE: 60 BPM | DIASTOLIC BLOOD PRESSURE: 74 MMHG | HEIGHT: 55 IN | TEMPERATURE: 98.8 F | RESPIRATION RATE: 18 BRPM | OXYGEN SATURATION: 96 % | WEIGHT: 121.25 LBS | SYSTOLIC BLOOD PRESSURE: 205 MMHG | BODY MASS INDEX: 28.06 KG/M2

## 2021-08-14 PROCEDURE — 250N000013 HC RX MED GY IP 250 OP 250 PS 637: Performed by: FAMILY MEDICINE

## 2021-08-14 PROCEDURE — 250N000013 HC RX MED GY IP 250 OP 250 PS 637: Performed by: INTERNAL MEDICINE

## 2021-08-14 PROCEDURE — 250N000011 HC RX IP 250 OP 636: Performed by: HOSPITALIST

## 2021-08-14 PROCEDURE — 99239 HOSP IP/OBS DSCHRG MGMT >30: CPT | Performed by: INTERNAL MEDICINE

## 2021-08-14 PROCEDURE — 99232 SBSQ HOSP IP/OBS MODERATE 35: CPT | Performed by: INTERNAL MEDICINE

## 2021-08-14 RX ORDER — HYDROMORPHONE HYDROCHLORIDE 1 MG/ML
1-2 SOLUTION ORAL
Qty: 90 ML | Refills: 0
Start: 2021-08-14

## 2021-08-14 RX ORDER — TORSEMIDE 20 MG/1
100 TABLET ORAL DAILY
Status: DISCONTINUED | OUTPATIENT
Start: 2021-08-14 | End: 2021-08-14 | Stop reason: HOSPADM

## 2021-08-14 RX ORDER — SENNOSIDES A AND B 8.6 MG/1
1-2 TABLET, FILM COATED ORAL 2 TIMES DAILY PRN
Qty: 100 TABLET | Refills: 11
Start: 2021-08-14

## 2021-08-14 RX ORDER — ATROPINE SULFATE 10 MG/ML
1-2 SOLUTION/ DROPS OPHTHALMIC EVERY 4 HOURS PRN
Qty: 5 ML | Refills: 0
Start: 2021-08-14

## 2021-08-14 RX ORDER — HALOPERIDOL 2 MG/ML
.5-1 SOLUTION ORAL EVERY 6 HOURS PRN
Qty: 10 ML | Refills: 0
Start: 2021-08-14

## 2021-08-14 RX ORDER — NIFEDIPINE 60 MG/1
60 TABLET, EXTENDED RELEASE ORAL DAILY
Status: DISCONTINUED | OUTPATIENT
Start: 2021-08-14 | End: 2021-08-14 | Stop reason: HOSPADM

## 2021-08-14 RX ORDER — LORAZEPAM 2 MG/ML
.25-.5 CONCENTRATE ORAL EVERY 4 HOURS PRN
Qty: 30 ML | Refills: 0
Start: 2021-08-14

## 2021-08-14 RX ORDER — BISACODYL 10 MG
10 SUPPOSITORY, RECTAL RECTAL DAILY PRN
Qty: 2 SUPPOSITORY | Refills: 11
Start: 2021-08-14

## 2021-08-14 RX ADMIN — LABETALOL HYDROCHLORIDE 20 MG: 5 INJECTION, SOLUTION INTRAVENOUS at 08:43

## 2021-08-14 RX ADMIN — TORSEMIDE 40 MG: 20 TABLET ORAL at 11:54

## 2021-08-14 RX ADMIN — NIFEDIPINE 60 MG: 60 TABLET, FILM COATED, EXTENDED RELEASE ORAL at 10:05

## 2021-08-14 RX ADMIN — DOCUSATE SODIUM 50 MG AND SENNOSIDES 8.6 MG 1 TABLET: 8.6; 5 TABLET, FILM COATED ORAL at 08:56

## 2021-08-14 RX ADMIN — ACETAMINOPHEN 975 MG: 325 TABLET ORAL at 08:57

## 2021-08-14 RX ADMIN — PANTOPRAZOLE SODIUM 40 MG: 20 TABLET, DELAYED RELEASE ORAL at 08:56

## 2021-08-14 NOTE — PLAN OF CARE
Patient transferred from ICU this shift. Hmong speaking. Family by bed side. Patient declined tylenol and senna at bedtime, but requested to have prn blood pressure medication. Received Labetalol for SBP greater than 180.

## 2021-08-14 NOTE — PLAN OF CARE
Problem: Adult Inpatient Plan of Care  Goal: Optimal Comfort and Wellbeing  Outcome: Adequate for Discharge  Patient denies pain, Patient's son in room, BP's elevated, Labetolol given x1,  BP remains elevated, MD aware. Patient is a comfort care patient, will be discharged to home with hospice, all safety measures on,  Luevano dc'd per patient and family request and order from MD, PICC line and peripheral line dc'd

## 2021-08-14 NOTE — PROGRESS NOTES
Renal progress note  CC:ESKD progression from CKD 5  Assessment and Plan:  80-year-old female with history of CKD 3, DM, history of Graves' disease, CKD 5 with baseline creatinine around 4-5, renal hypertension, admitted with uremic symptoms and hypervolemia with HTN urgency. 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   Imaging negative for hydro, showed mildly echogenic kidneys.   --With her low kidney functions and progressive decline secondary to diabetes at this point she she has kidney function in the range of needing dialysis support if life-prolonging therapies are considered  --She reportedly does not want dialysis, has had multiple discussion since 03/2021 and was referred for hospice in  in 5/202. She was able to understand the consequences with that decision, she is still contemplating the burden it will hav on her QOL.   -- appears uremic with encephalopathy , myoclonus , hypervolemia and hyperkalemia  -- detailed discussions with sons yesterday at bedside and Mao regarding dialysis likely being permanent and not improve her QOL and her condition being not reversible with dialysis and she was able to repeat her wishes to not consider it if it cannot fix her   -- will support palliative measures at this time and follow peripherally for any additional concerns     Renal hypertension -   Keep BP >150/90   meds are more for comfort can consider torsemide if breathing is an issue  -- gave torsemide and Nifedipine for BP and hypervolemia  Further  meds per Palliative     Hypervolemia - Volume up on exam   Had a good response to diuresis  -- comfortable on NC O2     Anemia - Hemoglobin stable     Metabolic acidosis - PTA oral bicarbonate     Diabetes mellitus type II - With history of poor control but more recently A1c <7%  --Hemoglobin A1c 6.7 this admission     Coronary artery disease - On aspirin, statin     CKD-MBD - In February calcium and phosphorus were in range, phosphorus elevated this admission. Calcium corrects to ~9, will start Tums with meals as binder     Hypothyroidism - On replacement,     At this point with hospice decisions will follow peripherally     Thank you for the consultation we will follow  Nisha Pike MD  [facility]  [phone]      Subjective  Son at bedside  Feels OK ,   Plan for discharge at noon with hospice    Objective    Vital signs in last 24 hours  Temp:  [97.9  F (36.6  C)-98.8  F (37.1  C)] 98.8  F (37.1  C)  Pulse:  [59-74] 68  Resp:  [15-26] 18  BP: (145-204)/(66-85) 204/77  FiO2 (%):  [2 %] 2 %  SpO2:  [91 %-97 %] 97 %  Weight:   [unfilled]    Intake/Output last 3 shifts  I/O last 3 completed shifts:  In: 240 [P.O.:240]  Out: 780 [Urine:780]  Intake/Output this shift:  No intake/output data recorded.    Physical Exam  awake, NAD  CV: RRR without murmur or rub  Lung: coarse and wheezing  Ab: soft and NT; not distended; normal bs  Ext: +edema and well perfused  Skin; no rash    Pertinent Labs   Lab Results   Component Value Date    WBC 11.0 08/12/2021    HGB 9.7 (L) 08/12/2021    HCT 29.7 (L) 08/12/2021    MCV 86 08/12/2021     (L) 08/12/2021     Lab Results   Component Value Date    BUN 66 (H) 08/13/2021     08/13/2021    CO2 20 (L) 08/13/2021       Lab Results   Component Value Date    ALBUMIN 2.7 (L) 08/12/2021     Lab Results   Component Value Date    PHOS 6.0 (H) 03/28/2021     I reviewed all lab results  Nisha Pike MD

## 2021-08-14 NOTE — PROGRESS NOTES
Care Management Discharge Note    Discharge Date: 08/14/2021       Discharge Disposition:  Home with family with [facility] Hospice services    Discharge Services:  Hospice services    Discharge DME:      Discharge Transportation:  [facility] stretcher transport, ride time 1200    Private pay costs discussed: transportation costs; anticipate patient will have coverage    PAS Confirmation Code:    Patient/family educated on Medicare website which has current facility and service quality ratings:      Education Provided on the Discharge Plan:    Persons Notified of Discharge Plans: patient, sons Eder and NarcisoMatilde RN with hospice, bedside RN Autumn  Patient/Family in Agreement with the Plan:      Handoff Referral Completed: Yes    Additional Information:  Spoke with pt and family to introduce role and confirm discharge plan; in agreement with pt discharging to home with hospice services. Matilde Glynn with [facility] has RN scheduled to admit patient to services at home today at 1300. Tentative [facility] stretcher ride set up for 1200 for oxygen needs, inability to manage oxygen independently. Family discussing option of transporting pt to home.    1100 Son Narciso confirms they would like [facility] stretcher transport. PCS completed and faxed to billing office.        Jessica Bhardwaj, RN

## 2021-08-14 NOTE — PLAN OF CARE
Problem: Adult Inpatient Plan of Care  Goal: Readiness for Transition of Care  Outcome: Improving     Problem: Pain Acute  Goal: Acceptable Pain Control and Functional Ability  Outcome: Improving     Pt vital signs were stable with the exception of an elevated BP. Her BP did not meet the requirements fore her PRN BP meds. She denied pain overnight. Her griggs is patent and draining appropriately. She is expecting to go home today on comfortnt  cares.

## 2021-08-14 NOTE — DISCHARGE SUMMARY
[facility] MEDICINE  DISCHARGE SUMMARY     Primary Care Physician: Ty Martinez  Admission Date: 8/12/2021   Discharge Provider: TOBY Qureshi Discharge Date: 8/14/2021   Diet: Regular diet   Code Status: No CPR- Do NOT Intubate   Activity: DCACTIVITY: Activity as tolerated        Condition at Discharge: Stable     REASON FOR PRESENTATION(See Admission Note for Details)   Progressive fatigue, loss of appetite, shortness of breath and edema    PRINCIPAL & ACTIVE DISCHARGE DIAGNOSES     Active Problems:    Hypertensive emergency    Insulin dependent type 2 diabetes mellitus (H)    Hyperkalemia    ARF (acute renal failure) (H)    CKD-V    PENDING LABS     Unresulted Labs Ordered in the Past 30 Days of this Admission     No orders found from 7/13/2021 to 8/13/2021.            PROCEDURES ( this hospitalization only)          RECOMMENDATIONS TO OUTPATIENT PROVIDER FOR F/U VISIT     Follow-up Appointments     Follow-up and recommended labs and tests       Hospice to follow the patient at home.                 DISPOSITION     home with hospice    SUMMARY OF HOSPITAL COURSE:      Mao Her is a 81 year old female with history of advanced chronic kidney disease stage V, type 2 diabetes, hypertension, hypothyroidism, chronic anemia, dyslipidemia, GERD, chronic pain, COPD brought to the emergency department for evaluation of progressive fatigue, loss of appetite, shortness of breath and worsening edema for about a wk. Admitted in ICU for hyperkalemia and hypertensive emergency. Case discussed with patient's son, Eder over the phone, and Dr Burger from [facility]. Nephrology consulted. Decision was been made not to initiate HD. Family members chose hospice, and is now going home with hospice care. Comfort care meds ordered. Please review the admission H&P for details. In summary,    Acute kidney injury on chronic kidney disease stage V  Volume overload  - Creatinine in March 2021 4.16 -  4.42  - IV Bumex 4 mg x 1  - Nephrology and palliative care consult. Patient had declined HD in the past, and has declined it this admission as well. Wished to go home on hospice.     Hyperkalemia  - Potassium 6.2 with some nonspecific peaked T waves  - Treated with albuterol, calcium gluconate, dextrose, insulin, sodium bicarbonate  - Potassium down to 5.2. Now comfort care. Stopped checking labs     Hypertensive emergency  - Systolic blood pressure 208-240 on arrival  - Nitroglycerin drip ordered but not started. Cont home meds.     Type 2 diabetes mellitus with long-term insulin  - Stop accuchecks and insulin as the patient is under hospice now     Hypothyroidism  - Cont levothyroxine     COPD  - Home inhalers prn    Discharge Medications with Med changes:     Current Discharge Medication List      START taking these medications    Details   atropine 1 % ophthalmic solution Take 1-2 drops by mouth, place under tongue or place inside cheek every 4 hours as needed for secretions  Qty: 5 mL, Refills: 0    Associated Diagnoses: Hospice care patient      bisacodyl (DULCOLAX) 10 MG suppository Place 1 suppository (10 mg) rectally daily as needed for constipation  Qty: 2 suppository, Refills: 11    Associated Diagnoses: Hospice care patient      haloperidol (HALDOL) 2 MG/ML (HIGH CONC) solution Take 0.25-0.5 mLs (0.5-1 mg) by mouth every 6 hours as needed for agitation or other (nausea)  Qty: 10 mL, Refills: 0    Associated Diagnoses: Hospice care patient      HYDROmorphone, HIGH CONC, (DILAUDID) 10 mg/mL LIQD oral Take 0.1-0.2 mLs (1-2 mg) by mouth or place under tongue every 2 hours as needed for other (pain or shortness of breath/dyspnea)  Qty: 90 mL, Refills: 0    Comments: Hospice patient. Dispense in quantities of 30 mL.  Associated Diagnoses: Hospice care patient      LORazepam (ATIVAN) 2 MG/ML (HIGH CONC) solution Take 0.125-0.25 mLs (0.25-0.5 mg) by mouth or place under tongue every 4 hours as needed for  anxiety (restlessness)  Qty: 30 mL, Refills: 0    Associated Diagnoses: Hospice care patient      senna (SENNA LAXATIVE) 8.6 MG tablet Take 1-2 tablets by mouth 2 times daily as needed for constipation  Qty: 100 tablet, Refills: 11    Associated Diagnoses: Hospice care patient         CONTINUE these medications which have NOT CHANGED    Details   acetaminophen (TYLENOL) 325 MG tablet Take 975 mg by mouth every 6 hours as needed       fluticasone propionate (FLONASE) 50 mcg/actuation nasal spray [FLUTICASONE PROPIONATE (FLONASE) 50 MCG/ACTUATION NASAL SPRAY] Apply 1 spray into each nostril daily as needed for rhinitis.      hydrALAZINE (APRESOLINE) 25 MG tablet [HYDRALAZINE (APRESOLINE) 25 MG TABLET] Take 1 tablet (25 mg total) by mouth 3 (three) times a day.  Qty: 90 tablet, Refills: 0    Associated Diagnoses: Accelerated hypertension      latanoprost (XALATAN) 0.005 % ophthalmic solution [LATANOPROST (XALATAN) 0.005 % OPHTHALMIC SOLUTION] Administer 1 drop to both eyes at bedtime.      levothyroxine (SYNTHROID, LEVOTHROID) 125 MCG tablet [LEVOTHYROXINE (SYNTHROID, LEVOTHROID) 125 MCG TABLET] Take 1 tablet (125 mcg total) by mouth Daily at 6:00 am.  Qty: 30 tablet, Refills: 0    Associated Diagnoses: Community acquired pneumonia, unspecified laterality      metoprolol succinate ER (TOPROL-XL) 25 MG 24 hr tablet Take 25 mg by mouth daily      NIFEdipine ER (ADALAT CC) 60 MG 24 hr tablet Take 60 mg by mouth daily      torsemide (DEMADEX) 100 MG tablet [TORSEMIDE (DEMADEX) 100 MG TABLET] Take 1 tablet (100 mg total) by mouth daily.  Qty: 30 tablet, Refills: 0    Associated Diagnoses: RUDOLPH (acute kidney injury) (H)         STOP taking these medications       aspirin 81 MG EC tablet Comments:   Reason for Stopping:         atorvastatin (LIPITOR) 40 MG tablet Comments:   Reason for Stopping:         NOVOLOG FLEXPEN U-100 INSULIN 100 unit/mL (3 mL) injection pen Comments:   Reason for Stopping:                     Rationale  "for medication changes:      Please see the discharge summary        Consults   palliative care      Immunizations given this encounter     Most Recent Immunizations   Administered Date(s) Administered     Flu, Unspecified 08/28/2016     Influenza (High Dose) 3 valent vaccine 02/13/2020     Tdap (Adacel,Boostrix) 07/26/2017           Anticoagulation Information      Recent INR results:   Recent Labs   Lab 08/13/21  0113   INR 1.06     Warfarin doses (if applicable) or name of other anticoagulant: NA      SIGNIFICANT IMAGING FINDINGS     No results found for this visit on 08/12/21.    SIGNIFICANT LABORATORY FINDINGS     Most Recent 3 CBC's:Recent Labs   Lab Test 08/12/21  2231 03/30/21  0616 03/28/21  0623 03/26/21  1228   WBC 11.0  --  10.1 13.8*   HGB 9.7*  --  9.4* 10.6*   MCV 86  --  86 83   * 150 152 191     Most Recent 3 BMP's:Recent Labs   Lab Test 08/13/21  1144 08/13/21  0752 08/13/21  0624 08/13/21  0521 08/13/21  0113 08/12/21  2231 03/30/21  0616   NA  --   --   --  140  --  137 140   POTASSIUM  --   --   --  5.4*  5.4* 5.2* 6.2* 3.8   CHLORIDE  --   --   --  111*  --  111* 106   CO2  --   --   --  20*  --  16* 25   BUN  --   --   --  66*  --  73* 35*   CR  --   --   --  7.95*  --  7.91* 4.42*   ANIONGAP  --   --   --  9  --  10 9   BRIANDA  --   --   --  8.3*  --  8.4* 8.4*   * 135* 123 141*  --  108 80         Discharge Orders        Reason for your hospital stay    Acute renal failure on CKD- V     Follow-up and recommended labs and tests     Hospice to follow the patient at home.     Activity    Your activity upon discharge: activity as tolerated     Diet    Follow this diet upon discharge: Regular       Examination   BP (!) 205/74 (BP Location: Left arm)   Pulse 60   Temp 98.8  F (37.1  C) (Oral)   Resp 18   Ht 1.346 m (4' 5\")   Wt 55 kg (121 lb 4.1 oz)   SpO2 96%   BMI 30.35 kg/m        General: Not in obvious distress. Lethargic, but responds to commands appropirately  HEENT: NC, " AT   Chest: Mild bilateral wheezing on auscultation  Heart: S1S2 normal, regular. No M/R/G  Abdomen: Soft. NT, ND. Bowel sounds- active.  Extremities: No legs swelling  Neuro: alert and awake, grossly non-focal      Please see EMR for more detailed significant labs, imaging, consultant notes etc.    I, TOBY Qureshi, personally saw the patient today and spent greater than 30 minutes discharging this patient.    TOBY Qureshi  [facility]    CC:Ty Martinez

## 2021-08-14 NOTE — PROGRESS NOTES
Spoke with Dr Sifuentes re discharge meds. Informed that if family takes pt home they likely would not be there prior to discharge so requested he put hospice comfort meds on orders but not send to pharmacy and that I would order from [facility] pharmacy to be delivered to pt home. Spoke with melanie Stuart, confirmed equipment had been delivered. Informed that if they wanted to bring pt home to bring the oxygen to the hospital for transport home. Also informed that medications would not be sent home with pt but would be delivered to the home in the next few hours.  Talked with Jessica CARO who states she talked with melanie Gaines and was setting up pt transport for pt. She will clarify if transport will be needed.     Thank you for your care and assistance with this family,     Matilde Glynn RN BSN PHN PN  Hospice Referral Specialist  [facility]    [phone]  [email or web address]

## 2021-08-27 LAB
ATRIAL RATE - MUSE: 93 BPM
DIASTOLIC BLOOD PRESSURE - MUSE: NORMAL MMHG
INTERPRETATION ECG - MUSE: NORMAL
P AXIS - MUSE: 7 DEGREES
PR INTERVAL - MUSE: 160 MS
QRS DURATION - MUSE: 96 MS
QT - MUSE: 360 MS
QTC - MUSE: 447 MS
R AXIS - MUSE: 55 DEGREES
SYSTOLIC BLOOD PRESSURE - MUSE: NORMAL MMHG
T AXIS - MUSE: 0 DEGREES
VENTRICULAR RATE- MUSE: 93 BPM